# Patient Record
Sex: FEMALE | Race: WHITE | NOT HISPANIC OR LATINO | Employment: FULL TIME | URBAN - METROPOLITAN AREA
[De-identification: names, ages, dates, MRNs, and addresses within clinical notes are randomized per-mention and may not be internally consistent; named-entity substitution may affect disease eponyms.]

---

## 2018-02-20 ENCOUNTER — AMBULATORY - HEALTHEAST (OUTPATIENT)
Dept: INTERNAL MEDICINE | Facility: CLINIC | Age: 48
End: 2018-02-20

## 2018-02-21 ENCOUNTER — OFFICE VISIT - HEALTHEAST (OUTPATIENT)
Dept: INTERNAL MEDICINE | Facility: CLINIC | Age: 48
End: 2018-02-21

## 2018-02-21 DIAGNOSIS — F32.A CHRONIC DEPRESSION: ICD-10-CM

## 2018-02-21 DIAGNOSIS — Z00.00 ANNUAL PHYSICAL EXAM: ICD-10-CM

## 2018-02-21 DIAGNOSIS — Z12.11 COLON CANCER SCREENING: ICD-10-CM

## 2018-02-21 DIAGNOSIS — F17.200 TOBACCO DEPENDENCE: ICD-10-CM

## 2018-02-21 DIAGNOSIS — N39.41 URGE INCONTINENCE: ICD-10-CM

## 2018-02-21 DIAGNOSIS — Z12.31 VISIT FOR SCREENING MAMMOGRAM: ICD-10-CM

## 2018-02-21 DIAGNOSIS — I10 HYPERTENSION: ICD-10-CM

## 2018-02-21 DIAGNOSIS — R32 BLADDER INCONTINENCE: ICD-10-CM

## 2018-02-21 DIAGNOSIS — Z78.0 POSTMENOPAUSAL: ICD-10-CM

## 2018-02-21 LAB
ALBUMIN UR-MCNC: NEGATIVE MG/DL
APPEARANCE UR: CLEAR
BASOPHILS # BLD AUTO: 0 THOU/UL (ref 0–0.2)
BASOPHILS NFR BLD AUTO: 1 % (ref 0–2)
BILIRUB UR QL STRIP: NEGATIVE
COLOR UR AUTO: YELLOW
EOSINOPHIL # BLD AUTO: 0.2 THOU/UL (ref 0–0.4)
EOSINOPHIL NFR BLD AUTO: 3 % (ref 0–6)
ERYTHROCYTE [DISTWIDTH] IN BLOOD BY AUTOMATED COUNT: 10.9 % (ref 11–14.5)
GLUCOSE UR STRIP-MCNC: NEGATIVE MG/DL
HCT VFR BLD AUTO: 40.2 % (ref 35–47)
HGB BLD-MCNC: 13.4 G/DL (ref 12–16)
HGB UR QL STRIP: NEGATIVE
KETONES UR STRIP-MCNC: NEGATIVE MG/DL
LEUKOCYTE ESTERASE UR QL STRIP: NEGATIVE
LYMPHOCYTES # BLD AUTO: 1.4 THOU/UL (ref 0.8–4.4)
LYMPHOCYTES NFR BLD AUTO: 24 % (ref 20–40)
MCH RBC QN AUTO: 32.6 PG (ref 27–34)
MCHC RBC AUTO-ENTMCNC: 33.3 G/DL (ref 32–36)
MCV RBC AUTO: 98 FL (ref 80–100)
MONOCYTES # BLD AUTO: 0.4 THOU/UL (ref 0–0.9)
MONOCYTES NFR BLD AUTO: 7 % (ref 2–10)
NEUTROPHILS # BLD AUTO: 4 THOU/UL (ref 2–7.7)
NEUTROPHILS NFR BLD AUTO: 66 % (ref 50–70)
NITRATE UR QL: NEGATIVE
PH UR STRIP: 5.5 [PH] (ref 5–8)
PLATELET # BLD AUTO: 295 THOU/UL (ref 140–440)
PMV BLD AUTO: 6.3 FL (ref 7–10)
RBC # BLD AUTO: 4.11 MILL/UL (ref 3.8–5.4)
SP GR UR STRIP: <=1.005 (ref 1–1.03)
UROBILINOGEN UR STRIP-ACNC: NORMAL
WBC: 6 THOU/UL (ref 4–11)

## 2018-02-21 ASSESSMENT — MIFFLIN-ST. JEOR: SCORE: 1106.98

## 2018-02-22 ENCOUNTER — RECORDS - HEALTHEAST (OUTPATIENT)
Dept: ADMINISTRATIVE | Facility: OTHER | Age: 48
End: 2018-02-22

## 2018-02-22 LAB
ALBUMIN SERPL-MCNC: 4.3 G/DL (ref 3.5–5)
ALP SERPL-CCNC: 82 U/L (ref 45–120)
ALT SERPL W P-5'-P-CCNC: 34 U/L (ref 0–45)
ANION GAP SERPL CALCULATED.3IONS-SCNC: 10 MMOL/L (ref 5–18)
AST SERPL W P-5'-P-CCNC: 48 U/L (ref 0–40)
ATRIAL RATE - MUSE: 74 BPM
BILIRUB DIRECT SERPL-MCNC: 0.3 MG/DL
BILIRUB SERPL-MCNC: 0.8 MG/DL (ref 0–1)
BUN SERPL-MCNC: 4 MG/DL (ref 8–22)
CALCIUM SERPL-MCNC: 9.5 MG/DL (ref 8.5–10.5)
CHLORIDE BLD-SCNC: 94 MMOL/L (ref 98–107)
CHOLEST SERPL-MCNC: 253 MG/DL
CO2 SERPL-SCNC: 24 MMOL/L (ref 22–31)
CREAT SERPL-MCNC: 0.56 MG/DL (ref 0.6–1.1)
DIASTOLIC BLOOD PRESSURE - MUSE: NORMAL MMHG
ERYTHROCYTE [SEDIMENTATION RATE] IN BLOOD BY WESTERGREN METHOD: 5 MM/HR (ref 0–20)
GFR SERPL CREATININE-BSD FRML MDRD: >60 ML/MIN/1.73M2
GLUCOSE BLD-MCNC: 77 MG/DL (ref 70–125)
INTERPRETATION ECG - MUSE: NORMAL
P AXIS - MUSE: 77 DEGREES
POTASSIUM BLD-SCNC: 4.3 MMOL/L (ref 3.5–5)
PR INTERVAL - MUSE: 150 MS
PROT SERPL-MCNC: 7.5 G/DL (ref 6–8)
QRS DURATION - MUSE: 88 MS
QT - MUSE: 370 MS
QTC - MUSE: 410 MS
R AXIS - MUSE: 66 DEGREES
SODIUM SERPL-SCNC: 128 MMOL/L (ref 136–145)
SYSTOLIC BLOOD PRESSURE - MUSE: NORMAL MMHG
T AXIS - MUSE: 60 DEGREES
TSH SERPL DL<=0.005 MIU/L-ACNC: 2.17 UIU/ML (ref 0.3–5)
VENTRICULAR RATE- MUSE: 74 BPM

## 2018-02-23 ENCOUNTER — COMMUNICATION - HEALTHEAST (OUTPATIENT)
Dept: INTERNAL MEDICINE | Facility: CLINIC | Age: 48
End: 2018-02-23

## 2018-02-25 ENCOUNTER — AMBULATORY - HEALTHEAST (OUTPATIENT)
Dept: INTERNAL MEDICINE | Facility: CLINIC | Age: 48
End: 2018-02-25

## 2018-02-25 DIAGNOSIS — R79.89 LOW VITAMIN D LEVEL: ICD-10-CM

## 2018-02-25 DIAGNOSIS — G60.8 PERIPHERAL SENSORY NEUROPATHY: ICD-10-CM

## 2018-02-25 LAB
25(OH)D3 SERPL-MCNC: 5.4 NG/ML (ref 30–80)
25(OH)D3 SERPL-MCNC: 5.4 NG/ML (ref 30–80)

## 2018-02-26 ENCOUNTER — COMMUNICATION - HEALTHEAST (OUTPATIENT)
Dept: INTERNAL MEDICINE | Facility: CLINIC | Age: 48
End: 2018-02-26

## 2018-04-04 ENCOUNTER — COMMUNICATION - HEALTHEAST (OUTPATIENT)
Dept: INTERNAL MEDICINE | Facility: CLINIC | Age: 48
End: 2018-04-04

## 2018-04-05 ENCOUNTER — OFFICE VISIT - HEALTHEAST (OUTPATIENT)
Dept: INTERNAL MEDICINE | Facility: CLINIC | Age: 48
End: 2018-04-05

## 2018-04-05 DIAGNOSIS — G60.8 PERIPHERAL SENSORY NEUROPATHY: ICD-10-CM

## 2018-04-05 DIAGNOSIS — R79.89 LOW VITAMIN D LEVEL: ICD-10-CM

## 2018-04-05 DIAGNOSIS — Z78.0 POSTMENOPAUSAL: ICD-10-CM

## 2018-04-05 DIAGNOSIS — I42.9 CARDIOMYOPATHY (H): ICD-10-CM

## 2018-04-05 DIAGNOSIS — T07.XXXA FRACTURES: ICD-10-CM

## 2018-04-05 DIAGNOSIS — R79.89 ELEVATED BRAIN NATRIURETIC PEPTIDE (BNP) LEVEL: ICD-10-CM

## 2018-04-05 DIAGNOSIS — E87.1 HYPONATREMIA: ICD-10-CM

## 2018-04-05 DIAGNOSIS — F32.A CHRONIC DEPRESSION: ICD-10-CM

## 2018-04-05 DIAGNOSIS — I10 HYPERTENSION: ICD-10-CM

## 2018-04-05 LAB
ALBUMIN SERPL-MCNC: 4 G/DL (ref 3.5–5)
ANION GAP SERPL CALCULATED.3IONS-SCNC: 14 MMOL/L (ref 5–18)
BNP SERPL-MCNC: 23 PG/ML (ref 0–67)
BUN SERPL-MCNC: 4 MG/DL (ref 8–22)
CALCIUM SERPL-MCNC: 9.7 MG/DL (ref 8.5–10.5)
CHLORIDE BLD-SCNC: 93 MMOL/L (ref 98–107)
CO2 SERPL-SCNC: 20 MMOL/L (ref 22–31)
CREAT SERPL-MCNC: 0.55 MG/DL (ref 0.6–1.1)
GFR SERPL CREATININE-BSD FRML MDRD: >60 ML/MIN/1.73M2
GLUCOSE BLD-MCNC: 78 MG/DL (ref 70–125)
PHOSPHATE SERPL-MCNC: 4.2 MG/DL (ref 2.5–4.5)
POTASSIUM BLD-SCNC: 4.5 MMOL/L (ref 3.5–5)
PTH-INTACT SERPL-MCNC: 49 PG/ML (ref 10–86)
SODIUM SERPL-SCNC: 127 MMOL/L (ref 136–145)
VIT B12 SERPL-MCNC: >2000 PG/ML (ref 213–816)

## 2018-04-05 ASSESSMENT — MIFFLIN-ST. JEOR: SCORE: 1106.8

## 2018-04-09 LAB
GLIADIN IGA SER-ACNC: 0.8 U/ML
GLIADIN IGG SER-ACNC: <0.4 U/ML
IGA SERPL-MCNC: 132 MG/DL (ref 65–400)
TTG IGA SER-ACNC: 0.2 U/ML
TTG IGG SER-ACNC: <0.6 U/ML

## 2018-04-17 ENCOUNTER — RECORDS - HEALTHEAST (OUTPATIENT)
Dept: BONE DENSITY | Facility: CLINIC | Age: 48
End: 2018-04-17

## 2018-04-17 ENCOUNTER — RECORDS - HEALTHEAST (OUTPATIENT)
Dept: ADMINISTRATIVE | Facility: OTHER | Age: 48
End: 2018-04-17

## 2018-04-17 DIAGNOSIS — T14.8XXA OTHER INJURY OF UNSPECIFIED BODY REGION, INITIAL ENCOUNTER: ICD-10-CM

## 2018-04-17 DIAGNOSIS — Z78.0 ASYMPTOMATIC MENOPAUSAL STATE: ICD-10-CM

## 2018-04-23 ENCOUNTER — AMBULATORY - HEALTHEAST (OUTPATIENT)
Dept: INTERNAL MEDICINE | Facility: CLINIC | Age: 48
End: 2018-04-23

## 2018-06-06 ENCOUNTER — OFFICE VISIT - HEALTHEAST (OUTPATIENT)
Dept: INTERNAL MEDICINE | Facility: CLINIC | Age: 48
End: 2018-06-06

## 2018-06-06 DIAGNOSIS — H91.90 HEARING LOSS: ICD-10-CM

## 2018-06-06 DIAGNOSIS — M85.80 OSTEOPENIA: ICD-10-CM

## 2018-06-06 DIAGNOSIS — R79.89 LOW VITAMIN D LEVEL: ICD-10-CM

## 2018-06-06 DIAGNOSIS — E87.1 HYPONATREMIA: ICD-10-CM

## 2018-06-06 DIAGNOSIS — I10 HYPERTENSION: ICD-10-CM

## 2018-06-06 LAB
ALBUMIN SERPL-MCNC: 4.2 G/DL (ref 3.5–5)
ANION GAP SERPL CALCULATED.3IONS-SCNC: 12 MMOL/L (ref 5–18)
BUN SERPL-MCNC: 6 MG/DL (ref 8–22)
CALCIUM SERPL-MCNC: 9.8 MG/DL (ref 8.5–10.5)
CHLORIDE BLD-SCNC: 95 MMOL/L (ref 98–107)
CO2 SERPL-SCNC: 23 MMOL/L (ref 22–31)
CREAT SERPL-MCNC: 0.54 MG/DL (ref 0.6–1.1)
GFR SERPL CREATININE-BSD FRML MDRD: >60 ML/MIN/1.73M2
GLUCOSE BLD-MCNC: 75 MG/DL (ref 70–125)
PHOSPHATE SERPL-MCNC: 4.8 MG/DL (ref 2.5–4.5)
POTASSIUM BLD-SCNC: 4.4 MMOL/L (ref 3.5–5)
SODIUM SERPL-SCNC: 130 MMOL/L (ref 136–145)

## 2018-06-06 ASSESSMENT — MIFFLIN-ST. JEOR: SCORE: 1120.4

## 2018-06-07 LAB
25(OH)D3 SERPL-MCNC: 12.6 NG/ML (ref 30–80)
25(OH)D3 SERPL-MCNC: 12.6 NG/ML (ref 30–80)

## 2018-06-19 ENCOUNTER — COMMUNICATION - HEALTHEAST (OUTPATIENT)
Dept: INTERNAL MEDICINE | Facility: CLINIC | Age: 48
End: 2018-06-19

## 2018-10-24 ENCOUNTER — COMMUNICATION - HEALTHEAST (OUTPATIENT)
Dept: INTERNAL MEDICINE | Facility: CLINIC | Age: 48
End: 2018-10-24

## 2018-10-29 ENCOUNTER — OFFICE VISIT - HEALTHEAST (OUTPATIENT)
Dept: INTERNAL MEDICINE | Facility: CLINIC | Age: 48
End: 2018-10-29

## 2018-10-29 ENCOUNTER — COMMUNICATION - HEALTHEAST (OUTPATIENT)
Dept: INTERNAL MEDICINE | Facility: CLINIC | Age: 48
End: 2018-10-29

## 2018-10-29 DIAGNOSIS — I10 HYPERTENSION: ICD-10-CM

## 2018-10-29 DIAGNOSIS — G43.909 MIGRAINE HEADACHE: ICD-10-CM

## 2018-10-29 DIAGNOSIS — R10.84 ABDOMINAL PAIN, GENERALIZED: ICD-10-CM

## 2018-10-29 DIAGNOSIS — R10.30 LOWER ABDOMINAL PAIN: ICD-10-CM

## 2018-10-29 LAB
ALBUMIN UR-MCNC: NEGATIVE MG/DL
APPEARANCE UR: CLEAR
BACTERIA #/AREA URNS HPF: ABNORMAL HPF
BILIRUB UR QL STRIP: NEGATIVE
COLOR UR AUTO: YELLOW
GLUCOSE UR STRIP-MCNC: NEGATIVE MG/DL
HGB UR QL STRIP: ABNORMAL
KETONES UR STRIP-MCNC: NEGATIVE MG/DL
LEUKOCYTE ESTERASE UR QL STRIP: ABNORMAL
NITRATE UR QL: NEGATIVE
PH UR STRIP: 6 [PH] (ref 5–8)
RBC #/AREA URNS AUTO: ABNORMAL HPF
SP GR UR STRIP: <=1.005 (ref 1–1.03)
SQUAMOUS #/AREA URNS AUTO: ABNORMAL LPF
UROBILINOGEN UR STRIP-ACNC: ABNORMAL
WBC #/AREA URNS AUTO: ABNORMAL HPF

## 2018-10-29 ASSESSMENT — MIFFLIN-ST. JEOR: SCORE: 1124.94

## 2018-10-30 ENCOUNTER — HOSPITAL ENCOUNTER (OUTPATIENT)
Dept: CT IMAGING | Facility: CLINIC | Age: 48
Discharge: HOME | End: 2018-10-30
Attending: INTERNAL MEDICINE

## 2018-10-30 ENCOUNTER — COMMUNICATION - HEALTHEAST (OUTPATIENT)
Dept: INTERNAL MEDICINE | Facility: CLINIC | Age: 48
End: 2018-10-30

## 2018-10-30 DIAGNOSIS — R10.30 LOWER ABDOMINAL PAIN: ICD-10-CM

## 2018-10-30 LAB
ALBUMIN SERPL-MCNC: 3.7 G/DL (ref 3.5–5)
ALBUMIN SERPL-MCNC: 3.7 G/DL (ref 3.5–5)
ALP SERPL-CCNC: 79 U/L (ref 45–120)
ALT SERPL W P-5'-P-CCNC: 26 U/L (ref 0–45)
ANION GAP SERPL CALCULATED.3IONS-SCNC: 15 MMOL/L (ref 5–18)
AST SERPL W P-5'-P-CCNC: 34 U/L (ref 0–40)
BILIRUB DIRECT SERPL-MCNC: 0.2 MG/DL
BILIRUB SERPL-MCNC: 0.4 MG/DL (ref 0–1)
BUN SERPL-MCNC: <2 MG/DL (ref 8–22)
CALCIUM SERPL-MCNC: 9 MG/DL (ref 8.5–10.5)
CHLORIDE BLD-SCNC: 90 MMOL/L (ref 98–107)
CO2 SERPL-SCNC: 20 MMOL/L (ref 22–31)
CREAT SERPL-MCNC: 0.53 MG/DL (ref 0.6–1.1)
GFR SERPL CREATININE-BSD FRML MDRD: >60 ML/MIN/1.73M2
GLUCOSE BLD-MCNC: 100 MG/DL (ref 70–125)
LIPASE SERPL-CCNC: 43 U/L (ref 0–52)
PHOSPHATE SERPL-MCNC: 2.9 MG/DL (ref 2.5–4.5)
POTASSIUM BLD-SCNC: 3.4 MMOL/L (ref 3.5–5)
PROT SERPL-MCNC: 6.7 G/DL (ref 6–8)
SODIUM SERPL-SCNC: 125 MMOL/L (ref 136–145)

## 2018-11-01 ENCOUNTER — AMBULATORY - HEALTHEAST (OUTPATIENT)
Dept: INTERNAL MEDICINE | Facility: CLINIC | Age: 48
End: 2018-11-01

## 2018-11-01 ENCOUNTER — COMMUNICATION - HEALTHEAST (OUTPATIENT)
Dept: INTERNAL MEDICINE | Facility: CLINIC | Age: 48
End: 2018-11-01

## 2018-11-12 ENCOUNTER — COMMUNICATION - HEALTHEAST (OUTPATIENT)
Dept: INTERNAL MEDICINE | Facility: CLINIC | Age: 48
End: 2018-11-12

## 2018-11-19 ENCOUNTER — COMMUNICATION - HEALTHEAST (OUTPATIENT)
Dept: INTERNAL MEDICINE | Facility: CLINIC | Age: 48
End: 2018-11-19

## 2019-02-24 ENCOUNTER — COMMUNICATION - HEALTHEAST (OUTPATIENT)
Dept: INTERNAL MEDICINE | Facility: CLINIC | Age: 49
End: 2019-02-24

## 2019-02-24 DIAGNOSIS — I10 HYPERTENSION: ICD-10-CM

## 2019-05-04 ENCOUNTER — COMMUNICATION - HEALTHEAST (OUTPATIENT)
Dept: INTERNAL MEDICINE | Facility: CLINIC | Age: 49
End: 2019-05-04

## 2019-05-04 DIAGNOSIS — I10 HYPERTENSION: ICD-10-CM

## 2020-06-05 ENCOUNTER — HOME CARE/HOSPICE - HEALTHEAST (OUTPATIENT)
Dept: HOME HEALTH SERVICES | Facility: HOME HEALTH | Age: 50
End: 2020-06-05

## 2020-06-05 ENCOUNTER — COMMUNICATION - HEALTHEAST (OUTPATIENT)
Dept: NEUROSURGERY | Facility: CLINIC | Age: 50
End: 2020-06-05

## 2020-06-05 DIAGNOSIS — S06.2XAA: ICD-10-CM

## 2020-06-07 ENCOUNTER — COMMUNICATION - HEALTHEAST (OUTPATIENT)
Dept: INTERNAL MEDICINE | Facility: CLINIC | Age: 50
End: 2020-06-07

## 2020-06-07 DIAGNOSIS — I10 HYPERTENSION: ICD-10-CM

## 2020-06-08 ENCOUNTER — COMMUNICATION - HEALTHEAST (OUTPATIENT)
Dept: HOME HEALTH SERVICES | Facility: HOME HEALTH | Age: 50
End: 2020-06-08

## 2020-06-11 ENCOUNTER — COMMUNICATION - HEALTHEAST (OUTPATIENT)
Dept: SCHEDULING | Facility: CLINIC | Age: 50
End: 2020-06-11

## 2020-06-15 ENCOUNTER — COMMUNICATION - HEALTHEAST (OUTPATIENT)
Dept: INTERNAL MEDICINE | Facility: CLINIC | Age: 50
End: 2020-06-15

## 2020-06-19 ENCOUNTER — HOSPITAL ENCOUNTER (OUTPATIENT)
Dept: CT IMAGING | Facility: CLINIC | Age: 50
Discharge: HOME | End: 2020-06-19
Attending: NEUROLOGICAL SURGERY

## 2020-06-19 DIAGNOSIS — S06.2XAA: ICD-10-CM

## 2020-06-23 ENCOUNTER — OFFICE VISIT - HEALTHEAST (OUTPATIENT)
Dept: NEUROSURGERY | Facility: CLINIC | Age: 50
End: 2020-06-23

## 2020-06-23 ENCOUNTER — COMMUNICATION - HEALTHEAST (OUTPATIENT)
Dept: NEUROSURGERY | Facility: CLINIC | Age: 50
End: 2020-06-23

## 2020-06-23 DIAGNOSIS — S06.2X1D CONTUSION OF BRAIN WITH LOSS OF CONSCIOUSNESS OF 30 MINUTES OR LESS, SUBSEQUENT ENCOUNTER: ICD-10-CM

## 2020-07-23 ENCOUNTER — COMMUNICATION - HEALTHEAST (OUTPATIENT)
Dept: PHARMACY | Facility: CLINIC | Age: 50
End: 2020-07-23

## 2020-07-23 DIAGNOSIS — R56.9 SEIZURE (H): ICD-10-CM

## 2020-07-23 DIAGNOSIS — I10 HYPERTENSION: ICD-10-CM

## 2020-07-24 ENCOUNTER — HOSPITAL ENCOUNTER (OUTPATIENT)
Dept: CT IMAGING | Facility: CLINIC | Age: 50
Discharge: HOME | End: 2020-07-24

## 2020-07-24 DIAGNOSIS — S06.2X1D CONTUSION OF BRAIN WITH LOSS OF CONSCIOUSNESS OF 30 MINUTES OR LESS, SUBSEQUENT ENCOUNTER: ICD-10-CM

## 2020-07-28 ENCOUNTER — OFFICE VISIT - HEALTHEAST (OUTPATIENT)
Dept: NEUROSURGERY | Facility: CLINIC | Age: 50
End: 2020-07-28

## 2020-07-28 DIAGNOSIS — E87.1 HYPONATREMIA: ICD-10-CM

## 2020-07-28 DIAGNOSIS — I62.9 ACUTE INTRA-CRANIAL HEMORRHAGE (H): ICD-10-CM

## 2020-07-28 DIAGNOSIS — R56.9 SEIZURE (H): ICD-10-CM

## 2020-07-29 ENCOUNTER — COMMUNICATION - HEALTHEAST (OUTPATIENT)
Dept: ADMINISTRATIVE | Facility: CLINIC | Age: 50
End: 2020-07-29

## 2020-08-03 ENCOUNTER — COMMUNICATION - HEALTHEAST (OUTPATIENT)
Dept: INTERNAL MEDICINE | Facility: CLINIC | Age: 50
End: 2020-08-03

## 2020-08-03 ENCOUNTER — OFFICE VISIT - HEALTHEAST (OUTPATIENT)
Dept: INTERNAL MEDICINE | Facility: CLINIC | Age: 50
End: 2020-08-03

## 2020-08-03 DIAGNOSIS — Z12.31 ENCOUNTER FOR SCREENING MAMMOGRAM FOR BREAST CANCER: ICD-10-CM

## 2020-08-03 DIAGNOSIS — I10 ESSENTIAL HYPERTENSION: ICD-10-CM

## 2020-08-03 DIAGNOSIS — F32.1 CURRENT MODERATE EPISODE OF MAJOR DEPRESSIVE DISORDER, UNSPECIFIED WHETHER RECURRENT (H): ICD-10-CM

## 2020-08-03 DIAGNOSIS — Z12.11 ENCOUNTER FOR SCREENING COLONOSCOPY: ICD-10-CM

## 2020-08-03 DIAGNOSIS — G40.909 SEIZURE DISORDER (H): ICD-10-CM

## 2020-08-03 DIAGNOSIS — E87.1 HYPONATREMIA: ICD-10-CM

## 2020-08-03 ASSESSMENT — ANXIETY QUESTIONNAIRES
7. FEELING AFRAID AS IF SOMETHING AWFUL MIGHT HAPPEN: MORE THAN HALF THE DAYS
2. NOT BEING ABLE TO STOP OR CONTROL WORRYING: MORE THAN HALF THE DAYS
1. FEELING NERVOUS, ANXIOUS, OR ON EDGE: MORE THAN HALF THE DAYS
IF YOU CHECKED OFF ANY PROBLEMS ON THIS QUESTIONNAIRE, HOW DIFFICULT HAVE THESE PROBLEMS MADE IT FOR YOU TO DO YOUR WORK, TAKE CARE OF THINGS AT HOME, OR GET ALONG WITH OTHER PEOPLE: SOMEWHAT DIFFICULT
GAD7 TOTAL SCORE: 11
5. BEING SO RESTLESS THAT IT IS HARD TO SIT STILL: SEVERAL DAYS
6. BECOMING EASILY ANNOYED OR IRRITABLE: MORE THAN HALF THE DAYS
3. WORRYING TOO MUCH ABOUT DIFFERENT THINGS: SEVERAL DAYS
4. TROUBLE RELAXING: SEVERAL DAYS

## 2020-08-03 ASSESSMENT — PATIENT HEALTH QUESTIONNAIRE - PHQ9: SUM OF ALL RESPONSES TO PHQ QUESTIONS 1-9: 9

## 2020-08-04 LAB
ANION GAP SERPL CALCULATED.3IONS-SCNC: 12 MMOL/L (ref 5–18)
BUN SERPL-MCNC: 4 MG/DL (ref 8–22)
CALCIUM SERPL-MCNC: 10 MG/DL (ref 8.5–10.5)
CHLORIDE BLD-SCNC: 95 MMOL/L (ref 98–107)
CO2 SERPL-SCNC: 25 MMOL/L (ref 22–31)
CREAT SERPL-MCNC: 0.58 MG/DL (ref 0.6–1.1)
GFR SERPL CREATININE-BSD FRML MDRD: >60 ML/MIN/1.73M2
GLUCOSE BLD-MCNC: 75 MG/DL (ref 70–125)
LEVETIRACETAM (KEPPRA): 15.2 UG/ML (ref 6–46)
POTASSIUM BLD-SCNC: 4.6 MMOL/L (ref 3.5–5)
SODIUM SERPL-SCNC: 132 MMOL/L (ref 136–145)

## 2020-08-05 ENCOUNTER — COMMUNICATION - HEALTHEAST (OUTPATIENT)
Dept: INTERNAL MEDICINE | Facility: CLINIC | Age: 50
End: 2020-08-05

## 2020-08-06 ENCOUNTER — COMMUNICATION - HEALTHEAST (OUTPATIENT)
Dept: INTERNAL MEDICINE | Facility: CLINIC | Age: 50
End: 2020-08-06

## 2020-08-11 ENCOUNTER — COMMUNICATION - HEALTHEAST (OUTPATIENT)
Dept: INTERNAL MEDICINE | Facility: CLINIC | Age: 50
End: 2020-08-11

## 2020-08-12 PROBLEM — S06.341A: Status: ACTIVE | Noted: 2020-06-02

## 2020-08-12 PROBLEM — F17.200 TOBACCO USE DISORDER: Status: ACTIVE | Noted: 2020-08-12

## 2020-08-12 PROBLEM — G93.40 ENCEPHALOPATHY: Status: ACTIVE | Noted: 2020-06-04

## 2020-08-21 ENCOUNTER — COMMUNICATION - HEALTHEAST (OUTPATIENT)
Dept: PHARMACY | Facility: CLINIC | Age: 50
End: 2020-08-21

## 2020-08-21 DIAGNOSIS — R56.9 SEIZURE (H): ICD-10-CM

## 2020-08-25 ENCOUNTER — RECORDS - HEALTHEAST (OUTPATIENT)
Dept: ADMINISTRATIVE | Facility: OTHER | Age: 50
End: 2020-08-25

## 2020-08-25 ENCOUNTER — RECORDS - HEALTHEAST (OUTPATIENT)
Dept: NEUROLOGY | Facility: CLINIC | Age: 50
End: 2020-08-25

## 2020-08-25 ENCOUNTER — VIRTUAL VISIT (OUTPATIENT)
Dept: NEUROLOGY | Facility: CLINIC | Age: 50
End: 2020-08-25
Payer: COMMERCIAL

## 2020-08-25 VITALS — BODY MASS INDEX: 19.63 KG/M2 | HEIGHT: 64 IN | WEIGHT: 115 LBS

## 2020-08-25 DIAGNOSIS — F10.930 ALCOHOL WITHDRAWAL SEIZURE WITHOUT COMPLICATION (H): ICD-10-CM

## 2020-08-25 DIAGNOSIS — S06.331D: ICD-10-CM

## 2020-08-25 DIAGNOSIS — R56.9 ALCOHOL WITHDRAWAL SEIZURE WITHOUT COMPLICATION (H): ICD-10-CM

## 2020-08-25 PROBLEM — I62.9 ACUTE INTRA-CRANIAL HEMORRHAGE (H): Status: ACTIVE | Noted: 2020-06-02

## 2020-08-25 PROBLEM — I62.9 INTRACRANIAL HEMORRHAGE (H): Status: ACTIVE | Noted: 2020-06-02

## 2020-08-25 PROBLEM — S06.33AA CEREBRAL CONTUSION (H): Status: ACTIVE | Noted: 2020-08-25

## 2020-08-25 PROBLEM — F10.10 ETOH ABUSE: Status: ACTIVE | Noted: 2020-08-25

## 2020-08-25 PROBLEM — F32.A CHRONIC DEPRESSION: Status: ACTIVE | Noted: 2018-02-21

## 2020-08-25 PROBLEM — F10.939 ALCOHOL WITHDRAWAL SEIZURE (H): Status: ACTIVE | Noted: 2020-08-25

## 2020-08-25 PROBLEM — F12.10 CANNABIS ABUSE: Status: ACTIVE | Noted: 2020-08-25

## 2020-08-25 PROCEDURE — 99214 OFFICE O/P EST MOD 30 MIN: CPT | Mod: TEL | Performed by: PSYCHIATRY & NEUROLOGY

## 2020-08-25 ASSESSMENT — MIFFLIN-ST. JEOR: SCORE: 1126.64

## 2020-08-25 NOTE — PROGRESS NOTES
NEUROLOGY FOLLOW UP VISIT  NOTE       [facility] NEUROLOGY [place]  [address] [place] [address]  Tel: [phone]  Fax: [phone]  [email or web address]     Ivon Sheets,  1970, MRN [id number]  PCP: Allyn Watt, [phone]  Date: 2020     ASSESSMENT & PLAN     Diagnosis code:    Alcohol withdrawal seizure without complication (H)  Contusion of cerebrum with loss of consciousness of 30 minutes or less, unspecified laterality, subsequent encounter     Bilateral intracranial hemorrhage; H/O alcohol withdrawal seizure  50-year-old female with history of polysubstance abuse, alcohol abuse, alcohol withdrawal seizure, chronic hyponatremia, depression who was admitted to the hospital on 6/3/2020 after she had a seizure at a gas station.  She was noted to have bilateral intracranial hemorrhage that was managed conservatively.  Most recent CT scan shows complete resolution of the hemorrhage.  She was started on Keppra and she is wondering if she can discontinue Keppra.  I have recommended checking MRI of the head to rule out any contusion and a EEG and if normal I will taper her off Keppra.  She was again encouraged to avoid drinking.  Follow-up will be face-to-face after the EEG.    Thank you again for this referral, please feel free to contact me if you have any questions.    Nawaf Bolivar MD  [facility]  (Formerly, [facility]     HISTORY OF PRESENT ILLNESS     Patient is a 50-year-old female with history of polysubstance abuse, alcohol abuse, seizures, chronic hyponatremia, depression was brought to the emergency room on 6/3/2020 after she had a fall at a gas station hitting her head and had 2 seizures.  She was given Ativan in the emergency room and was noted to be agitated and was intubated.  She had a CT of the head that showed bilateral parietal and right temporal hemorrhages with minimal subarachnoid  hemorrhages.  She was started on Keppra and afterwards had an EEG that showed nonspecific slowing.  Due to contusion noted on CT scan she was continued on Keppra.  Most recent CT scan done on 7/24/2020 shows resolution of previously described multi-compartmental hemorrhage and with complete resolution of the right parietal scalp hematoma.  She was kept on Keppra and most recent level was therapeutic.  Since her discharge she reports she has been doing well and denies any seizures.  She reports multiple vague symptoms and is wondering if during the head injury she caused permanent damage to the brain.  She is also wondering why she runs chronic hyponatremia.  Her primary care physician had referred her to endocrinologist but she has difficulty getting an appointment.  Although she carries a history of seizures those likely were alcohol withdrawal seizures and that she is wondering if she can stop taking Keppra.     PROBLEM LIST   Patient Active Problem List   Diagnosis Code     Acute intra-cranial hemorrhage (H) I62.9     Intracranial hemorrhage (H) I62.9     Tobacco use disorder F17.200     Hyponatremia E87.1     ETOH abuse F10.10     Cannabis abuse F12.10     Takotsubo cardiomyopathy I51.81     Chronic depression F32.9     Marijuana use F12.90     Alcohol withdrawal seizure (H) F10.239, R56.9     Cerebral contusion (H) S06.339A         PAST MEDICAL & SURGICAL HISTORY     Past Medical History:   Patient  has no past medical history on file.    Surgical History:  She  has no past surgical history on file.     SOCIAL HISTORY     Reviewed, and she  reports that she has been smoking cigarettes. She has smoked for the past 26.00 years. She has never used smokeless tobacco. She reports current alcohol use of about 4.0 standard drinks of alcohol per week.     FAMILY HISTORY     Reviewed, and family history includes Alcoholism in her brother; Coronary Artery Disease in her father.     ALLERGIES     No Known Allergies   "    REVIEW OF SYSTEMS     A 12 point review of system was performed and was negative except as outlined in the history of present illness.     HOME MEDICATIONS       Current Outpatient Medications:      amLODIPine (NORVASC) 10 MG tablet, Take 5 mg by mouth, Disp: , Rfl:      levETIRAcetam (KEPPRA) 500 MG tablet, Take 500 mg by mouth 2 times daily, Disp: , Rfl:      magnesium oxide (MAG-OX) 400 MG tablet, Take 400 mg by mouth, Disp: , Rfl:      metoprolol succinate ER (TOPROL-XL) 25 MG 24 hr tablet, Take 25 mg by mouth, Disp: , Rfl:      HYDROcodone-acetaminophen (NORCO) 5-325 MG tablet, Take 1-2 tablets by mouth, Disp: , Rfl:      omeprazole (PRILOSEC) 20 MG DR capsule, Take 20 mg by mouth, Disp: , Rfl:      THIAMINE HCL PO, Take 100 mg by mouth, Disp: , Rfl:       PHYSICAL EXAM     Vital signs  Ht 1.626 m (5' 4\")   Wt 52.2 kg (115 lb)   BMI 19.74 kg/m      Weight:   115 lbs 0 oz    GENERAL PHYSICAL EXAM: Patient is alert and oriented x 4 in no acute distress. Neck was supple   NEUROLOGICAL EXAM:  Patient is alert and oriented x3 speech normal with no dysarthria or aphasia.  Cranial nerves intact.  She reports no weakness     DIAGNOSTIC STUDIES     PERTINENT RADIOLOGY  Following imaging studies were reviewed:     CT  7/24/20  1.  Interval resolution of previously described demonstrated multi compartmental hemorrhages.  2.  No evidence of new acute intracranial hemorrhage or mass effect.  3.  Near complete resolution of right parietal scalp hematoma.  6/4/20  1.  Tiny 2 mm hemorrhagic parenchymal contusion in the left parietal lobe is new from the prior exam. The remaining hemorrhagic parenchymal contusions are stable in size.   2.  Slightly decreased volume of subarachnoid hemorrhage.   6/3/20  1.  Multiple small acute intracranial hemorrhages as described above, which are mildly worse compared to CT head 06/02/2020. Please see above for discussion.   2.  No midline shift.      6/2/20  HEAD CT:   1.  Large right " "parietal scalp hematoma.   2.  Small bilateral parietal and small right temporal parenchymal hemorrhages consistent with post moderate.   3.  Minimal subarachnoid hemorrhage in the right sylvian fissure also most consistent with posttraumatic change.     CERVICAL SPINE CT:   1.  Mild paraseptal emphysematous change.   2.  No spinal canal or neural foraminal stenosis.      EEG  EEG shows low amplitude relatively normal background with occasional slowing more so on the left side.  This likely is due to patient's known intracerebral hemorrhage      PERTINENT LABS  Following labs were reviewed:     Ref. Range 8/3/2020 16:40   Sodium Latest Ref Range: 136 - 145 mmol/L 132 (L)   Potassium Latest Ref Range: 3.5 - 5.0 mmol/L 4.6   Chloride Latest Ref Range: 98 - 107 mmol/L 95 (L)   CO2 Latest Ref Range: 22 - 31 mmol/L 25   Anion Gap, Calculation Latest Ref Range: 5 - 18 mmol/L 12   BUN Latest Ref Range: 8 - 22 mg/dL 4 (L)   Creatinine Latest Ref Range: 0.60 - 1.10 mg/dL 0.58 (L)   GFR MDRD Af Amer Latest Ref Range: >60 mL/min/1.73m2 >60   GFR MDRD Non Af Amer Latest Ref Range: >60 mL/min/1.73m2 >60   Calcium Latest Ref Range: 8.5 - 10.5 mg/dL 10.0   Glucose Latest Ref Range: 70 - 125 mg/dL 75   Levetiracetam Latest Ref Range: 6.0 - 46.0 ug/mL 15.2         TELEPHONE VISIT CONSENT   This telephone visit was done in lieu of a face to face visit due to COVID 19 pandemic. The patient has been notified of following:    \"This telephone visit will be conducted via a call between you and your physician/provider. We have found that certain health care needs can be provided without the need for a physical exam. This service lets us provide the care you need with a short phone conversation. If a prescription is necessary we can send it directly to your pharmacy. If lab work is needed we can place an order for that and you can then stop by our lab to have the test done at a later time. If during the course of the call the " "physician/provider feels a telephone visit is not appropriate, you will not be charged for this service.\"    Patient has given verbal consent for Telephone visit? YES  Consent has been obtained for this service by 1 care team member: YES    Total Time: visit 30 minutes        Total time spent for face to face visit, reviewing labs/imaging studies, counseling and coordination of care was: 30 Minutes More than 50% of this time was spent on counseling and coordination of care.      This note was dictated using voice recognition software.  Any grammatical or context distortions are unintentional and inherent to the software.         "

## 2020-08-25 NOTE — LETTER
2020         RE: Ivon Sheets  [address]  [place] [address]        Dear Colleague,    Thank you for referring your patient, Ivon Sheets, to the [facility]. Please see a copy of my visit note below.    NEUROLOGY FOLLOW UP VISIT  NOTE       [facility] NEUROLOGY [place]  [address] [place] [address]  Tel: [phone]  Fax: [phone]  [email or web address]     Ivon Sheets,  1970, MRN [id number]  PCP: Allyn Watt, [phone]  Date: 2020     ASSESSMENT & PLAN     Diagnosis code:    Alcohol withdrawal seizure without complication (H)  Contusion of cerebrum with loss of consciousness of 30 minutes or less, unspecified laterality, subsequent encounter     Bilateral intracranial hemorrhage; H/O alcohol withdrawal seizure  50-year-old female with history of polysubstance abuse, alcohol abuse, alcohol withdrawal seizure, chronic hyponatremia, depression who was admitted to the hospital on 6/3/2020 after she had a seizure at a gas station.  She was noted to have bilateral intracranial hemorrhage that was managed conservatively.  Most recent CT scan shows complete resolution of the hemorrhage.  She was started on Keppra and she is wondering if she can discontinue Keppra.  I have recommended checking MRI of the head to rule out any contusion and a EEG and if normal I will taper her off Keppra.  She was again encouraged to avoid drinking.  Follow-up will be face-to-face after the EEG.    Thank you again for this referral, please feel free to contact me if you have any questions.    Nawaf Bolivar MD  [facility]  (Former[facility], [facility]     HISTORY OF PRESENT ILLNESS     Patient is a 50-year-old female with history of polysubstance abuse, alcohol abuse, seizures, chronic hyponatremia, depression was brought to the emergency room on 6/3/2020 after she had a fall  at a gas station hitting her head and had 2 seizures.  She was given Ativan in the emergency room and was noted to be agitated and was intubated.  She had a CT of the head that showed bilateral parietal and right temporal hemorrhages with minimal subarachnoid hemorrhages.  She was started on Keppra and afterwards had an EEG that showed nonspecific slowing.  Due to contusion noted on CT scan she was continued on Keppra.  Most recent CT scan done on 7/24/2020 shows resolution of previously described multi-compartmental hemorrhage and with complete resolution of the right parietal scalp hematoma.  She was kept on Keppra and most recent level was therapeutic.  Since her discharge she reports she has been doing well and denies any seizures.  She reports multiple vague symptoms and is wondering if during the head injury she caused permanent damage to the brain.  She is also wondering why she runs chronic hyponatremia.  Her primary care physician had referred her to endocrinologist but she has difficulty getting an appointment.  Although she carries a history of seizures those likely were alcohol withdrawal seizures and that she is wondering if she can stop taking Keppra.     PROBLEM LIST   Patient Active Problem List   Diagnosis Code     Acute intra-cranial hemorrhage (H) I62.9     Intracranial hemorrhage (H) I62.9     Tobacco use disorder F17.200     Hyponatremia E87.1     ETOH abuse F10.10     Cannabis abuse F12.10     Takotsubo cardiomyopathy I51.81     Chronic depression F32.9     Marijuana use F12.90     Alcohol withdrawal seizure (H) F10.239, R56.9     Cerebral contusion (H) S06.339A         PAST MEDICAL & SURGICAL HISTORY     Past Medical History:   Patient  has no past medical history on file.    Surgical History:  She  has no past surgical history on file.     SOCIAL HISTORY     Reviewed, and she  reports that she has been smoking cigarettes. She has smoked for the past 26.00 years. She has never used smokeless  "tobacco. She reports current alcohol use of about 4.0 standard drinks of alcohol per week.     FAMILY HISTORY     Reviewed, and family history includes Alcoholism in her brother; Coronary Artery Disease in her father.     ALLERGIES     No Known Allergies      REVIEW OF SYSTEMS     A 12 point review of system was performed and was negative except as outlined in the history of present illness.     HOME MEDICATIONS       Current Outpatient Medications:      amLODIPine (NORVASC) 10 MG tablet, Take 5 mg by mouth, Disp: , Rfl:      levETIRAcetam (KEPPRA) 500 MG tablet, Take 500 mg by mouth 2 times daily, Disp: , Rfl:      magnesium oxide (MAG-OX) 400 MG tablet, Take 400 mg by mouth, Disp: , Rfl:      metoprolol succinate ER (TOPROL-XL) 25 MG 24 hr tablet, Take 25 mg by mouth, Disp: , Rfl:      HYDROcodone-acetaminophen (NORCO) 5-325 MG tablet, Take 1-2 tablets by mouth, Disp: , Rfl:      omeprazole (PRILOSEC) 20 MG DR capsule, Take 20 mg by mouth, Disp: , Rfl:      THIAMINE HCL PO, Take 100 mg by mouth, Disp: , Rfl:       PHYSICAL EXAM     Vital signs  Ht 1.626 m (5' 4\")   Wt 52.2 kg (115 lb)   BMI 19.74 kg/m      Weight:   115 lbs 0 oz    GENERAL PHYSICAL EXAM: Patient is alert and oriented x 4 in no acute distress. Neck was supple   NEUROLOGICAL EXAM:  Patient is alert and oriented x3 speech normal with no dysarthria or aphasia.  Cranial nerves intact.  She reports no weakness     DIAGNOSTIC STUDIES     PERTINENT RADIOLOGY  Following imaging studies were reviewed:     CT  7/24/20  1.  Interval resolution of previously described demonstrated multi compartmental hemorrhages.  2.  No evidence of new acute intracranial hemorrhage or mass effect.  3.  Near complete resolution of right parietal scalp hematoma.  6/4/20  1.  Tiny 2 mm hemorrhagic parenchymal contusion in the left parietal lobe is new from the prior exam. The remaining hemorrhagic parenchymal contusions are stable in size.   2.  Slightly decreased volume of " "subarachnoid hemorrhage.   6/3/20  1.  Multiple small acute intracranial hemorrhages as described above, which are mildly worse compared to CT head 06/02/2020. Please see above for discussion.   2.  No midline shift.      6/2/20  HEAD CT:   1.  Large right parietal scalp hematoma.   2.  Small bilateral parietal and small right temporal parenchymal hemorrhages consistent with post moderate.   3.  Minimal subarachnoid hemorrhage in the right sylvian fissure also most consistent with posttraumatic change.     CERVICAL SPINE CT:   1.  Mild paraseptal emphysematous change.   2.  No spinal canal or neural foraminal stenosis.      EEG  EEG shows low amplitude relatively normal background with occasional slowing more so on the left side.  This likely is due to patient's known intracerebral hemorrhage      PERTINENT LABS  Following labs were reviewed:     Ref. Range 8/3/2020 16:40   Sodium Latest Ref Range: 136 - 145 mmol/L 132 (L)   Potassium Latest Ref Range: 3.5 - 5.0 mmol/L 4.6   Chloride Latest Ref Range: 98 - 107 mmol/L 95 (L)   CO2 Latest Ref Range: 22 - 31 mmol/L 25   Anion Gap, Calculation Latest Ref Range: 5 - 18 mmol/L 12   BUN Latest Ref Range: 8 - 22 mg/dL 4 (L)   Creatinine Latest Ref Range: 0.60 - 1.10 mg/dL 0.58 (L)   GFR MDRD Af Amer Latest Ref Range: >60 mL/min/1.73m2 >60   GFR MDRD Non Af Amer Latest Ref Range: >60 mL/min/1.73m2 >60   Calcium Latest Ref Range: 8.5 - 10.5 mg/dL 10.0   Glucose Latest Ref Range: 70 - 125 mg/dL 75   Levetiracetam Latest Ref Range: 6.0 - 46.0 ug/mL 15.2         TELEPHONE VISIT CONSENT   This telephone visit was done in lieu of a face to face visit due to COVID 19 pandemic. The patient has been notified of following:    \"This telephone visit will be conducted via a call between you and your physician/provider. We have found that certain health care needs can be provided without the need for a physical exam. This service lets us provide the care you need with a short phone " "conversation. If a prescription is necessary we can send it directly to your pharmacy. If lab work is needed we can place an order for that and you can then stop by our lab to have the test done at a later time. If during the course of the call the physician/provider feels a telephone visit is not appropriate, you will not be charged for this service.\"    Patient has given verbal consent for Telephone visit? YES  Consent has been obtained for this service by 1 care team member: YES    Total Time: visit 30 minutes        Total time spent for face to face visit, reviewing labs/imaging studies, counseling and coordination of care was: 30 Minutes More than 50% of this time was spent on counseling and coordination of care.      This note was dictated using voice recognition software.  Any grammatical or context distortions are unintentional and inherent to the software.           Again, thank you for allowing me to participate in the care of your patient.        Sincerely,        Nawaf Bolivar MD    "

## 2020-08-26 ENCOUNTER — RECORDS - HEALTHEAST (OUTPATIENT)
Dept: ADMINISTRATIVE | Facility: OTHER | Age: 50
End: 2020-08-26

## 2020-08-26 ENCOUNTER — TRANSCRIBE ORDERS (OUTPATIENT)
Dept: OTHER | Age: 50
End: 2020-08-26

## 2020-08-26 DIAGNOSIS — E87.1 HYPONATREMIA: Primary | ICD-10-CM

## 2020-08-31 ENCOUNTER — OFFICE VISIT - HEALTHEAST (OUTPATIENT)
Dept: INTERNAL MEDICINE | Facility: CLINIC | Age: 50
End: 2020-08-31

## 2020-08-31 ENCOUNTER — COMMUNICATION - HEALTHEAST (OUTPATIENT)
Dept: INTERNAL MEDICINE | Facility: CLINIC | Age: 50
End: 2020-08-31

## 2020-08-31 DIAGNOSIS — D64.9 NORMOCYTIC ANEMIA: ICD-10-CM

## 2020-08-31 DIAGNOSIS — Z12.11 COLON CANCER SCREENING: ICD-10-CM

## 2020-08-31 DIAGNOSIS — M85.851 OSTEOPENIA OF NECKS OF BOTH FEMURS: ICD-10-CM

## 2020-08-31 DIAGNOSIS — I10 ESSENTIAL HYPERTENSION: ICD-10-CM

## 2020-08-31 DIAGNOSIS — M85.852 OSTEOPENIA OF NECKS OF BOTH FEMURS: ICD-10-CM

## 2020-08-31 DIAGNOSIS — R56.9 SEIZURE (H): ICD-10-CM

## 2020-08-31 DIAGNOSIS — R79.89 LOW VITAMIN D LEVEL: ICD-10-CM

## 2020-08-31 DIAGNOSIS — E87.1 HYPONATREMIA: ICD-10-CM

## 2020-08-31 LAB
ANION GAP SERPL CALCULATED.3IONS-SCNC: 12 MMOL/L (ref 5–18)
BUN SERPL-MCNC: 6 MG/DL (ref 8–22)
CALCIUM SERPL-MCNC: 9.6 MG/DL (ref 8.5–10.5)
CHLORIDE BLD-SCNC: 98 MMOL/L (ref 98–107)
CO2 SERPL-SCNC: 23 MMOL/L (ref 22–31)
CREAT SERPL-MCNC: 0.55 MG/DL (ref 0.6–1.1)
GFR SERPL CREATININE-BSD FRML MDRD: >60 ML/MIN/1.73M2
GLUCOSE BLD-MCNC: 81 MG/DL (ref 70–125)
HGB BLD-MCNC: 12.9 G/DL (ref 12–16)
POTASSIUM BLD-SCNC: 4.1 MMOL/L (ref 3.5–5)
SODIUM SERPL-SCNC: 133 MMOL/L (ref 136–145)

## 2020-09-04 ENCOUNTER — TELEPHONE (OUTPATIENT)
Dept: ENDOCRINOLOGY | Facility: CLINIC | Age: 50
End: 2020-09-04

## 2020-09-04 NOTE — TELEPHONE ENCOUNTER
[facility] Call Center    Phone Message    May a detailed message be left on voicemail: yes     Reason for Call: Appointment Intake    Referring Provider Name: Referred by [facility]  Diagnosis and/or Symptoms: Hyponatremia     Diagnosis is not listed in our guidelines    Action Taken: Other: Endocrinology    Travel Screening: Not Applicable

## 2020-09-21 NOTE — TELEPHONE ENCOUNTER
Attempted call x2, VM box full. Pt can be scheduled as VIRTUAL VISIT NEW with any provider accepting new endocrine patients.

## 2020-09-23 ENCOUNTER — COMMUNICATION - HEALTHEAST (OUTPATIENT)
Dept: PHARMACY | Facility: CLINIC | Age: 50
End: 2020-09-23

## 2020-09-23 DIAGNOSIS — R56.9 SEIZURE (H): ICD-10-CM

## 2020-10-16 ENCOUNTER — COMMUNICATION - HEALTHEAST (OUTPATIENT)
Dept: INTERNAL MEDICINE | Facility: CLINIC | Age: 50
End: 2020-10-16

## 2020-11-10 ENCOUNTER — OFFICE VISIT - HEALTHEAST (OUTPATIENT)
Dept: INTERNAL MEDICINE | Facility: CLINIC | Age: 50
End: 2020-11-10

## 2020-11-10 DIAGNOSIS — I10 HYPERTENSION: ICD-10-CM

## 2020-11-10 DIAGNOSIS — E87.1 HYPONATREMIA: ICD-10-CM

## 2020-11-10 DIAGNOSIS — M75.42 IMPINGEMENT SYNDROME OF SHOULDER REGION, LEFT: ICD-10-CM

## 2020-11-10 DIAGNOSIS — R56.9 SEIZURE (H): ICD-10-CM

## 2020-11-10 DIAGNOSIS — E83.42 HYPOMAGNESEMIA: ICD-10-CM

## 2020-11-10 DIAGNOSIS — E55.9 VITAMIN D DEFICIENCY: ICD-10-CM

## 2020-11-10 LAB
ANION GAP SERPL CALCULATED.3IONS-SCNC: 15 MMOL/L (ref 5–18)
BUN SERPL-MCNC: 4 MG/DL (ref 8–22)
CALCIUM SERPL-MCNC: 9.6 MG/DL (ref 8.5–10.5)
CHLORIDE BLD-SCNC: 91 MMOL/L (ref 98–107)
CO2 SERPL-SCNC: 23 MMOL/L (ref 22–31)
CREAT SERPL-MCNC: 0.53 MG/DL (ref 0.6–1.1)
GFR SERPL CREATININE-BSD FRML MDRD: >60 ML/MIN/1.73M2
GLUCOSE BLD-MCNC: 78 MG/DL (ref 70–125)
MAGNESIUM SERPL-MCNC: 2.1 MG/DL (ref 1.8–2.6)
POTASSIUM BLD-SCNC: 4.7 MMOL/L (ref 3.5–5)
SODIUM SERPL-SCNC: 129 MMOL/L (ref 136–145)

## 2020-11-11 LAB
25(OH)D3 SERPL-MCNC: 80.8 NG/ML (ref 30–80)
25(OH)D3 SERPL-MCNC: 80.8 NG/ML (ref 30–80)

## 2020-11-16 ENCOUNTER — COMMUNICATION - HEALTHEAST (OUTPATIENT)
Dept: INTERNAL MEDICINE | Facility: CLINIC | Age: 50
End: 2020-11-16

## 2020-12-04 ENCOUNTER — COMMUNICATION - HEALTHEAST (OUTPATIENT)
Dept: PHARMACY | Facility: CLINIC | Age: 50
End: 2020-12-04

## 2020-12-04 DIAGNOSIS — R56.9 SEIZURE (H): ICD-10-CM

## 2021-05-26 ENCOUNTER — RECORDS - HEALTHEAST (OUTPATIENT)
Dept: ADMINISTRATIVE | Facility: CLINIC | Age: 51
End: 2021-05-26

## 2021-05-26 ASSESSMENT — PATIENT HEALTH QUESTIONNAIRE - PHQ9: SUM OF ALL RESPONSES TO PHQ QUESTIONS 1-9: 9

## 2021-05-28 ASSESSMENT — ANXIETY QUESTIONNAIRES: GAD7 TOTAL SCORE: 11

## 2021-05-28 NOTE — TELEPHONE ENCOUNTER
Refill Approved    Rx renewed per Medication Renewal Policy. Medication was last renewed on 4/5/18.    Ov: 10/29/18    Roseanna Martinez, [facility] Triage/Med Refill 5/5/2019     Requested Prescriptions   Pending Prescriptions Disp Refills     losartan (COZAAR) 50 MG tablet [Pharmacy Med Name: LOSARTAN 50MG TABLETS] 30 tablet 0     Sig: TAKE 1 TABLET(50 MG) BY MOUTH DAILY       Angiotensin Receptor Blocker Protocol Passed - 5/4/2019  3:54 AM        Passed - PCP or prescribing provider visit in past 12 months       Last office visit with prescriber/PCP: 10/29/2018 Chris South MD OR same dept: 10/29/2018 Chris South MD OR same specialty: 10/29/2018 Chris South MD  Last physical: Visit date not found Last MTM visit: Visit date not found   Next visit within 3 mo: Visit date not found  Next physical within 3 mo: Visit date not found  Prescriber OR PCP: Chris South MD  Last diagnosis associated with med order: 1. Hypertension  - losartan (COZAAR) 50 MG tablet [Pharmacy Med Name: LOSARTAN 50MG TABLETS]; TAKE 1 TABLET(50 MG) BY MOUTH DAILY  Dispense: 30 tablet; Refill: 0    If protocol passes may refill for 12 months if within 3 months of last provider visit (or a total of 15 months).             Passed - Serum potassium within the past 12 months     Lab Results   Component Value Date    Potassium 3.4 (L) 10/29/2018             Passed - Blood pressure filed in past 12 months     BP Readings from Last 1 Encounters:   10/29/18 148/82             Passed - Serum creatinine within the past 12 months     Creatinine   Date Value Ref Range Status   10/29/2018 0.53 (L) 0.60 - 1.10 mg/dL Final

## 2021-06-01 VITALS — WEIGHT: 115 LBS | HEIGHT: 63 IN | BODY MASS INDEX: 20.38 KG/M2

## 2021-06-01 VITALS — BODY MASS INDEX: 20.91 KG/M2 | WEIGHT: 118 LBS | HEIGHT: 63 IN

## 2021-06-01 VITALS — BODY MASS INDEX: 20.38 KG/M2 | WEIGHT: 115.04 LBS | HEIGHT: 63 IN

## 2021-06-02 VITALS — HEIGHT: 63 IN | BODY MASS INDEX: 21.09 KG/M2 | WEIGHT: 119 LBS

## 2021-06-04 VITALS
BODY MASS INDEX: 19.91 KG/M2 | DIASTOLIC BLOOD PRESSURE: 74 MMHG | WEIGHT: 116 LBS | HEART RATE: 101 BPM | OXYGEN SATURATION: 97 % | SYSTOLIC BLOOD PRESSURE: 132 MMHG

## 2021-06-04 VITALS
WEIGHT: 116 LBS | SYSTOLIC BLOOD PRESSURE: 138 MMHG | HEART RATE: 84 BPM | BODY MASS INDEX: 19.91 KG/M2 | DIASTOLIC BLOOD PRESSURE: 78 MMHG | OXYGEN SATURATION: 99 %

## 2021-06-05 VITALS
HEART RATE: 106 BPM | BODY MASS INDEX: 18.88 KG/M2 | SYSTOLIC BLOOD PRESSURE: 130 MMHG | WEIGHT: 110 LBS | TEMPERATURE: 98.2 F | OXYGEN SATURATION: 99 % | DIASTOLIC BLOOD PRESSURE: 68 MMHG

## 2021-06-08 NOTE — TELEPHONE ENCOUNTER
After multiple failed attempts over the last few days to reach this patient. We will be discarding this referral.    Fawad at [facility].

## 2021-06-08 NOTE — TELEPHONE ENCOUNTER
Provider Communication  Who is calling:  Alexa  [facility]   Reason for call: Alexa has been attempting to contact Ivon following hospitalization for case management but has been unsuccessful. Alexa is asking that pt be contacted for this and relay her info for pt to call.   Alexa page/ [facility] PH:[phone]  Urgency for return call:  as available  Okay to leave detailed message?:  Yes

## 2021-06-08 NOTE — TELEPHONE ENCOUNTER
PATIENT NAME:  Ivon Sheets  YOB: 1970  MRN: [id number]  SURGEON: Dr. Harry  DATE of CONSULT: 06/03/2020  Consult for traumatic hemorrhagic cranial contusions          FOLLOW-UP:  Hospital Follow Up Visit: 2 weeks  Post-op Provider: Telephone visit with NP  DIAGNOSTICS:  CT  REFERRAL to Neurology with Dr. Bolivar  DISPOSITION:  Home 06/04/2020    ADDITIONAL INSTRUCTIONS FOR MEDICAL STAFF:      Kirti Bernard RN, CNRN

## 2021-06-08 NOTE — TELEPHONE ENCOUNTER
RN cannot approve Refill Request    RN can NOT refill this medication Protocol failed and NO refill given.         Zulay Tucker, [facility] Triage/Med Refill 6/9/2020    Requested Prescriptions   Pending Prescriptions Disp Refills     metoprolol succinate (TOPROL-XL) 25 MG [Pharmacy Med Name: METOPROLOL ER SUCCINATE 25MG TABS] 90 tablet 3     Sig: TAKE 1 TABLET(25 MG) BY MOUTH DAILY       Beta-Blockers Refill Protocol Failed - 6/7/2020  5:08 PM        Failed - PCP or prescribing provider visit in past 12 months or next 3 months     Last office visit with prescriber/PCP: 10/29/2018 Chris South MD OR same dept: Visit date not found OR same specialty: 10/29/2018 Chris South MD  Last physical: Visit date not found Last MTM visit: Visit date not found   Next visit within 3 mo: Visit date not found  Next physical within 3 mo: Visit date not found  Prescriber OR PCP: Chris South MD  Last diagnosis associated with med order: 1. Hypertension  - metoprolol succinate (TOPROL-XL) 25 MG [Pharmacy Med Name: METOPROLOL ER SUCCINATE 25MG TABS]; TAKE 1 TABLET(25 MG) BY MOUTH DAILY  Dispense: 30 tablet; Refill: 7    If protocol passes may refill for 12 months if within 3 months of last provider visit (or a total of 15 months).             Passed - Blood pressure filed in past 12 months     BP Readings from Last 1 Encounters:   06/05/20 (!) 167/94

## 2021-06-08 NOTE — TELEPHONE ENCOUNTER
Left message to call back for: Ivon  Information to relay to patient:  Please relay message below from Alexa from .  Please have the patient contact Alexa as requested below.  Julieta HARLEY, TRISTIN/CMT....................11:14 AM

## 2021-06-09 NOTE — PROGRESS NOTES
"Ivon Sheets is a 49 y.o. female who is being evaluated via a billable telephone visit.      The patient has been notified of following:     \"This telephone visit will be conducted via a call between you and your physician/provider. We have found that certain health care needs can be provided without the need for a physical exam.  This service lets us provide the care you need with a short phone conversation.  If a prescription is necessary we can send it directly to your pharmacy.  If lab work is needed we can place an order for that and you can then stop by our lab to have the test done at a later time.    Telephone visits are billed at different rates depending on your insurance coverage. During this emergency period, for some insurers they may be billed the same as an in-person visit.  Please reach out to your insurance provider with any questions.    If during the course of the call the physician/provider feels a telephone visit is not appropriate, you will not be charged for this service.\"    Patient has given verbal consent to a Telephone visit? Yes    Patient would like to receive their AVS by my chart     Additional provider notes: Ivon is a 49 year old female follow up from hospital admission, S/P fall, seizure, hyponatremia, multiple cranial contusions, small SAH, history of alcohol abuse. CT today shows improvement and near resolution off areas of concern. Continues with small amount of edema and subacute blood products at site of previous hemorrhages. Slight improvement in right parietal scalp hematoma. Takes oxycodone 1 time a day. Denies dizziness. Denies nausea. Poor appetite at baseline. Is trying to establish relationship with primary care. Denies any significant complaints regarding concussion syndrome. She states she has no recall of the events of how she got to the hospital, why. She is annoyed she had a pregnancy test, reports being menopausal for 5 years. She has questions regarding " repeated head trauma through a life time. She has questions about her glucose. She wonders why her sodium is always low. Again, Recommend she establish care with primary care. She has continued her Keppra as instructed. Dr Bolivar's note documents indefinite keppra. Referred patient to calling neurology for questions regarding Keppra duration.     Due to high risk of further head trauma, on going edema recommend follow up CT in 4-6 weeks with telephone visit with NP.     Phone call duration: 25 minutes    Megan Benson CNP  [facility]   [phone]

## 2021-06-09 NOTE — TELEPHONE ENCOUNTER
Shannon called with some follow up questions for NP from her visit today, 6/23 she was admitted for head trauma and she wanted to know if she has a severe concussion? 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 She is also experiencing extreme irritability and was wondering if that is normal and does stress have any affect on her symtoms? Please call Ivon at [phone]

## 2021-06-09 NOTE — PATIENT INSTRUCTIONS - HE
1. Continue to avoid NSAIDS No Aspirin, Ibuprofen, Motrin, Advil, Aleve, Naproxen, or NSAIDs.    2. Call neurology re: Keppra for seizures    3. Call insurance company to obtain PCP name and number    4. Return to clinic in 4 - 6 weeks with new head CT    5. Call [phone] with the following symptoms:  *Worsening headache not relieved by the prescription given  *A headache that gets better or worse when you stand up or lie down  *Seizures  *Persistent nausea and vomiting  *Increased sleepiness or difficulty being awakened  *Change in ability to walk, see, think, or talk  *Worsening or new onset of weakness, or numbness and tingling  *Loss or change in your ability to control bowel or bladder function    6. OK to advance your activity. If you become symptomatic avoid those activities.

## 2021-06-10 NOTE — TELEPHONE ENCOUNTER
Authorizing: Allyn Watt, JAMES in [facility]             Diagnosis: Hyponatremia [E87.1]     Please review and advise on if okay to schedule with Julieta or if patient should be seen by MD Endocrine

## 2021-06-10 NOTE — PROGRESS NOTES
"This patient is being evaluated via a billable telephone visit.      The patient has been notified of following:     \"This telephone visit will be conducted via a call between you and your physician/provider. We have found that certain health care needs can be provided without the need for a physical exam.  This service lets us provide the care you need with a short phone conversation.  If a prescription is necessary we can send it directly to your pharmacy.  If lab work is needed we can place an order for that and you can then stop by our lab to have the test done at a later time.    Telephone visits are billed at different rates depending on your insurance coverage. During this emergency period, for some insurers they may be billed the same as an in-person visit.  Please reach out to your insurance provider with any questions.    If during the course of the call the physician/provider feels a telephone visit is not appropriate, you will not be charged for this service.\"    Patient has given verbal consent to a Telephone visit? Yes    Reason for visit:  Hospital follow-up visit for SAH, multiple cranial contusions    Orig consult 6/3/20. Pt hx etoh abuse had fall w seizure. Had hyponatremia, multiple cranial contusions, small SAH on head CT. Of note, she has has had 3 seizures in the last 6yrs.    Improving at last visit on 6/23. Head CT 4-6wks recommended. Was trying to establish relationship with primary care.     Subjective:  Pt c/o dizziness in bed at night when turns on her R side. Does not notice any symptoms during the day. No headaches. Taking keppra twice daily.    Imaging:  Personally reviewed images and radiologist impressions  EXAM: CT HEAD WO CONTRAST  LOC[facility]  DATE/TIME: 7/24/2020 11:42 AM     INDICATION: cranial contusions; SAH;  COMPARISON: Head CT 06/19/2020.  TECHNIQUE: Routine without IV contrast. Multiplanar reformats. Dose reduction techniques were " used.     FINDINGS:  Interval resolution of previously demonstrated multi compartmental hemorrhages. No evidence of new acute intracranial hemorrhage or mass effect. Scattered foci of decreased attenuation within the cerebral hemispheric white matter which are nonspecific,   though most commonly ascribed to chronic small vessel ischemic disease. The ventricles and sulci are prominent consistent with mild brain parenchymal volume loss. Gray-white matter differentiation is maintained. The basilar cisterns are patent.     The partially imaged globes are unremarkable. The partially imaged paranasal sinuses, mastoid air cells and middle ear cavities are unremarkable. The visualized skull base and calvarium are unremarkable. Near-complete resolution of right parietal scalp   hematoma.     IMPRESSION:   1.  Interval resolution of previously described demonstrated multi compartmental hemorrhages.  2.  No evidence of new acute intracranial hemorrhage or mass effect.  3.  Near complete resolution of right parietal scalp hematoma.    Diagnosis:  Subarachnoid hemorrhage  Brain contusion     Assessment/Plan:  Resolution of multi compartmental hemorrhages.  Dizziness at night ?r/t post concussive type sx.  Continue keppra  Referred to neurology for symptoms, also keppra mgmt given her hx multiple seizures in last 6yrs.   She may also choose to seek care at [facility].  Referred to endocrinology given her hx severe hyponatremia (117).  No need for further neurosurgery visits or head CTs     I have reviewed and updated the patient's Past Medical History, Social History, Family History and Medication List.  Patient's allergies were reviewed.    Phone call duration: 30 minutes    Allyn Watt [facility]  O. [phone]

## 2021-06-10 NOTE — PROGRESS NOTES
[facility] Clinic Note  Ivon Sheets   50 y.o. female    Date of Visit: 8/3/2020  Chief Complaint   Patient presents with     [facility]     Hospital Visit Follow Up     Alonso Joes 6/2-6/5     Assessment/Plan  1. Hyponatremia  Likely multifactorial, with poor PO intake and beer potomania. Not on any diuretics or other Rx that would cause this.   - Basic Metabolic Panel    2. Seizure disorder (H)  Reviewed inpatient hospital notes and discharge summary.  I will reach out to specialty scheduling regarding setting up that neurology and endocrine follow-up ASAP.  We will also check Keppra level, sodium level and resume thiamine supplement  - Basic Metabolic Panel  - thiamine 100 MG tablet; Take 1 tablet (100 mg total) by mouth daily.  Dispense: 100 tablet; Refill: 0  - Levetiracetam [Keppra ]    3. Encounter for screening colonoscopy  - Ambulatory referral for Colonoscopy    4. Encounter for screening mammogram for breast cancer  Patient notes she has never undergone a screening mammogram.  To note she is also due for a Pap smear which we can address in a follow-up visit  - Mammo Screening Bilateral; Future    5. Essential hypertension  Not optimally controlled.  Will continue taking metoprolol for now however I am unsure why she is on this medication.  Want to avoid any medication that affects renal function or electrolytes and thus will start with amlodipine.  Plan to follow-up in 1 month  - amLODIPine (NORVASC) 10 MG tablet; Take 0.5 tablets (5 mg total) by mouth daily.  Dispense: 30 tablet; Refill: 0    6. Current moderate episode of major depressive disorder, unspecified whether recurrent (H)  Counseled that she needs to follow-up with her therapist as soon as possible.  YOUSUF 7 of 11 and PHQ 9 of 9.  We will follow-up in 1 month on her mood and likely will place referral for psychiatry    Much or all of the text in this note was generated through the use of Dragon Dictate voice-to-text  software. Errors in spelling or words which seem out of context are unintentional. Sound alike errors, in particular, may have escaped editing  Aden Nunes MD    Post Discharge Medication Reconciliation Status: discharge medications reconciled, continue medications without change    Return in 4 weeks (on 8/31/2020), or if symptoms worsen or fail to improve.    Subjective  This 50 y.o. old female. Patient of Dr. South.  History of mood disorder, cannabis and polysubstance abuse, hypertension, migraines, chronic alcohol dependence, tobacco use, stress cardiomyopathy, depression, seizures.  Was recently hospitalized from 6/2/2020-6/5/2020 for 2 witnessed seizures in the setting of hitting the back right side of her head.  CT head shows small bilateral parietal and small right temporal parenchymal hemorrhages and a minimal subarachnoid hemorrhage in the right sylvian fissure consistent with posttraumatic changes.  Treated with IV Keppra (in part for alcohol withdrawals), magnesium oxide, omeprazole and thiamine.  And admitted to the ICU.  Intubated for airway protection.  Discharged more neurologically intact and cleared by psychiatry with plans to follow-up as an outpatient for chemical dependence, in addition to follow-up with neurosurgery/neurology in 3-4 weeks from the date of discharge.  Discharged on Keppra, magnesium oxide, omeprazole and thiamine. Repeat CT scan showed near resolution of the original findings. Since discharge, no more EtOH and denies any seizures. States she is trying to stay well hydrated. Lives with former partner.     Wants to make sure her metoprolol is meant to be continued. Wants to see a neurologist at [facility] as her father was treated for brain cancer there. Notes that this is the third seizure in the lat 6 years. 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 3 years ago, was at a family picnic and thus fell over. States  that she drinks a lot of beer and does not eat much. States that she has some dental issues that are affecting her ability to eat.   Smokes drags of cigarettes. Does not take too much salt. She takes Keppra at 9 am and around 7 pm daily. Also wants to know why she is on omeprazole when she does not have a history of acid reflux. She does not know what she was on ventilator. No hemoptysis or hemotochezia. Does report some discomfort/itch in her throat. PHQ9 of 9, GAD7 of 11.   Wants to know what else she can take for blood pressure. States she did not tolerate Losartan. TTE in 2014 was unremarkable, with EF 60%    ROS A comprehensive review of systems was performed and was otherwise negative    Medications, allergies, and problem list were reviewed and updated    Exam  General appearance: Pleasant, nontoxic-appearing, no acute distress, alert and oriented x4, underweight/thin  Vitals:    08/03/20 1540   BP: 138/78   Pulse: 84   SpO2: 99%     Vitals:    08/03/20 1531 08/03/20 1540   BP: 150/78 138/78   Patient Site: Right Arm Right Arm   Patient Position: Sitting Sitting   Cuff Size: Adult Regular Adult Regular   Pulse: 84    SpO2: 99%    Weight: 116 lb (52.6 kg)    EYES: Eyelids, conjunctiva, and sclera were normal.  Mild left ptosis   HEAD, EARS, NOSE, MOUTH, AND THROAT: Head and face were normal. Hearing was normal to voice.  NECK: Neck appearance was normal.   RESPIRATORY: Bilaterally with no crackles, wheezing or rhonchi  CARDIOVASCULAR: Regular S1 and S2.  Radial pulses intact.  No edema.  GASTROINTESTINAL: NABS, soft, nontender, nondistended, no hepatomegaly  MUSCULOSKELETAL: Skeletal configuration was normal and muscle mass was normal for age.   SKIN/HAIR/NAILS: Skin color was normal.    NEUROLOGIC: Alert and oriented to person, place, time, and circumstance. Speech was normal. Cranial nerves were normal. Motor strength was normal for age   PSYCHIATRIC:  Mood and affect were flat.  Poor eye contact. The  patient's judgment and insight were normal.  Additional Information   Current Outpatient Medications   Medication Sig Dispense Refill     HYDROcodone-acetaminophen 5-325 mg per tablet Take 1-2 tablets by mouth every 6 (six) hours as needed for pain.       levETIRAcetam (KEPPRA) 500 MG tablet Take 1 tablet (500 mg total) by mouth 2 (two) times a day. 60 tablet 0     magnesium oxide (MAG-OX) 400 mg (241.3 mg magnesium) tablet Take 1 tablet (400 mg total) by mouth daily.  0     metoprolol succinate (TOPROL-XL) 25 MG Take 1 tablet (25 mg total) by mouth daily. 30 tablet 7     amLODIPine (NORVASC) 10 MG tablet Take 0.5 tablets (5 mg total) by mouth daily. 30 tablet 0     thiamine 100 MG tablet Take 1 tablet (100 mg total) by mouth daily. 100 tablet 0     No current facility-administered medications for this visit.      No Known Allergies  Social History     Social History Narrative     Not on file     Social History     Tobacco Use     Smoking status: Current Every Day Smoker     Years: 26.00     Smokeless tobacco: Never Used   Substance Use Topics     Alcohol use: Yes     Comment: 3-4 beers/day     Drug use: Yes     Comment: marijuana     Family History   Problem Relation Age of Onset     Coronary artery disease Father      Alcohol abuse Brother      Past Surgical History:   Procedure Laterality Date     EYE SURGERY     Time: total time spent with the patient was 40 minutes of which >50% was spent in counseling and coordination of care

## 2021-06-10 NOTE — PATIENT INSTRUCTIONS - HE
We need to set up an appointment with Endocrinology and neurology ASAP    Please reconnect with your therapist regarding your mood    Try to resume, jimbo with your dogs, your walks, as it is good for your   mental and physical health    I have sent a Rx for thiamine (100 mg daily) to your pharmacy    We will check your electrolyte and keppra level today    I have discontinued the omeprazole from your medication list    In am starting you on amlodipine 5 mg daily to add to the metoprolol. I would like to see you in 1 month to check your BP and assess whether further changes can be made. Cutting down on smoking will also help lower your blood pressure.     I placed a referral for a screening colonoscopy and mammogram

## 2021-06-13 NOTE — PROGRESS NOTES
[facility]  [facility]   50 y.o. female    Date of Visit: 11/10/2020  Chief Complaint   Patient presents with     Shoulder Pain     LT shoulder - possible pinched nerve     Follow-up     4 month routine     Fall     Last Friday at work - injured foot - seeing work comp     Assessment/Plan  1. Hypertension  Much better controlled compared to when last checked.  Refill metoprolol today.  We will continue to take the amlodipine as prescribed.  Counseled on decreasing tobacco use in addition to decreasing her salt intake.  She noted that she had taken Wellbutrin and Chantix in the past with minimal improvement and significant side effects respectively.    - metoprolol succinate (TOPROL-XL) 25 MG; Take 1 tablet (25 mg total) by mouth daily.  Dispense: 90 tablet; Refill: 3  - Basic Metabolic Panel    2. Seizure (H)  - levETIRAcetam (KEPPRA) 500 MG tablet; Take 1 tablet (500 mg total) by mouth 2 (two) times a day.  Dispense: 180 tablet; Refill: 1    3. Hypomagnesemia  5. Hyponatremia  We will continue taking magnesium supplements.  Unsure if this is related to her periodic cramping.  Differential includes RLS, claudication and PLMD.  RLS less likely with absence of renal dysfunction or anemia. Provided her with patient instructions on potential etiologies and remedies she can do to address her symptoms.  - Magnesium    4. Vitamin D deficiency  She will continue taking vitamin D supplements.  - Vitamin D, Total (25-Hydroxy)     6. Impingement syndrome of shoulder region, left  Reviewed clavicle fracture imaging report with her indicating minimal inferior displacement and angulation on the lateral fragment of the distal left clavicular fracture.  Concern for potential radicular symptoms in the setting of impingement from this injury when resting on that side.  Not interested in pursuing orthopedic eval imaging at this time.  I counseled her to please abstain if possible from  rest the on the left side is much as possible.  If symptoms worsen, then would have a low threshold to send to Ortho versus PT for further eval.    Much or all of the text in this note was generated through the use of Dragon Dictate voice-to-text software. Errors in spelling or words which seem out of context are unintentional. Sound alike errors, in particular, may have escaped editing  Aden Nunes MD    Return in about 6 months (around 5/10/2021), or if symptoms worsen or fail to improve, for Follow up; pap smear.    Subjective  This 50 y.o. old female. Tripped at work last week on 11/6/2020. Filed a workman's comp. Injured the right lower leg. Walking into work down the parking ramp into the building. Slipped and landed on her arms. Felt okay for the first day. Now has a walking boot for a severe strain, with a possible torn ligament and a tiny calcaneal fracture at [facility].   Still experiencing a lot of cramping at night in her feet and leg.   Also c/o of bilateral leg and foot cramping at night when laying down. Is on 400 mg magnesium daily. Massage makes it better. Getting up and moving makes it better. Hgb 12.6 in 8/2020 and no history of kidney disease.   She broke her shoulder this summer and now has a pain when laying on the left side. The entire arm goes numb when she lays on the side.  No seizures since lacks activity and does not believe her seizure history is related to the fall.  No decrease in range of motion or weakness.    ROS A comprehensive review of systems was performed and was otherwise negative    Medications, allergies, and problem list were reviewed and updated    Exam  General appearance: Pleasant, nontoxic-appearing, no acute distress, alert and oriented x4, thin  Vitals:    11/10/20 1422   BP: 130/68   Pulse: (!) 106   Temp: 98.2  F (36.8  C)   SpO2: 99%   RESPIRATORY: Bilaterally with no crackles, wheezing or rhonchi  CARDIOVASCULAR: Tachycardic but regular.  Radial  pulses intact.  No edema.  GASTROINTESTINAL: NABS, soft, NT, ND  MUSCULOSKELETAL: Skeletal configuration was normal and muscle mass was normal for age.  Right foot boot. No decreased range of motion with left shoulder.  SKIN/HAIR/NAILS: Skin color was normal.  Healing scab on the left anterior knee.  NEUROLOGIC: Alert and oriented to person, place, time, and circumstance. Speech was normal. Motor strength was normal for age   PSYCHIATRIC:  Mood and affect were normal and the patient had normal recent and remote memory.   Additional Information   Current Outpatient Medications   Medication Sig Dispense Refill     amLODIPine (NORVASC) 10 MG tablet TAKE 1 TABLET BY MOUTH EVERY DAY 90 tablet 0     [START ON 11/18/2020] cholecalciferol, vitamin D3, (VITAMIN D3) 50 mcg (2,000 unit) Tab Take 1 tablet (2,000 Units total) by mouth daily. 100 tablet 2     ergocalciferol (ERGOCALCIFEROL) 1,250 mcg (50,000 unit) capsule Take 1 capsule (50,000 Units total) by mouth once a week for 12 doses. 12 capsule 0     HYDROcodone-acetaminophen 5-325 mg per tablet Take 1-2 tablets by mouth every 6 (six) hours as needed for pain.       levETIRAcetam (KEPPRA) 500 MG tablet Take 1 tablet (500 mg total) by mouth 2 (two) times a day. 180 tablet 1     magnesium oxide (MAG-OX) 400 mg (241.3 mg magnesium) tablet Take 1 tablet (400 mg total) by mouth daily.  0     metoprolol succinate (TOPROL-XL) 25 MG Take 1 tablet (25 mg total) by mouth daily. 90 tablet 3     thiamine 100 MG tablet Take 1 tablet (100 mg total) by mouth daily. 100 tablet 0     No current facility-administered medications for this visit.      No Known Allergies  Social History     Social History Narrative     Not on file     Social History     Tobacco Use     Smoking status: Current Every Day Smoker     Years: 26.00     Smokeless tobacco: Never Used   Substance Use Topics     Alcohol use: Yes     Comment: 3-4 beers/day     Drug use: Yes     Comment: marijuana     Family History    Problem Relation Age of Onset     Coronary artery disease Father      Alcohol abuse Brother      Past Surgical History:   Procedure Laterality Date     EYE SURGERY     Time: total time spent with the patient was 25 minutes of which >50% was spent in counseling and coordination of care

## 2021-06-16 NOTE — PROGRESS NOTES
Call    Your sodium is low as in the past  No real change  Due to alcohol  Decrease amount  and this will help  Keep return appt as scheduled

## 2021-06-16 NOTE — PROGRESS NOTES
LEFT MESSAGE FOR PT TO ARRIVE 15 MIN BEFORE APPOINTMENT AND TO BRING PHOTO ID AND INSURANCE CARD   6.5

## 2021-06-16 NOTE — PROGRESS NOTES
HISTORY/PHYSICAL EXAM  Ivon Sheets   47 y.o. female  Is here for a physical healthcare maintenance examination        Assessment/Plan for  Ivon Sheets is a 47 y.o. female.       1.  Healthcare maintenance examination  2.  Hypertension severe.  Needs to discontinue alcohol  Med compliance  New Rx metoprolol Exar daily  Clinic follow-up 1 month  3.  Urinary incontinence  Drinks a lot of caffeine/alcohol.  She is postmenopausal  I will have her see GYN Dr. Nyla March    4.  Tobacco dependence-suggest she quit smoking  5.  Postmenopausal  Cancer screening-mammogram, colonoscopy.  Declines colonoscopy.  Discussed cola guard-agreeable  6.  Chronic depression-seen psychologist and soon update with psychiatrist    Plan:  1.  Laboratory ECG  2.  Mammogram: Guard  3.  Metoprolol 25 low-dose  4.  GYN referral      I have counseled the patient for tobacco cessation and the follow up will occur  at the next visit.    There are no Patient Instructions on file for this visit.    Diagnoses and all orders for this visit:    Annual physical exam  -     HM1(CBC and Differential)  -     Erythrocyte Sedimentation Rate  -     Urinalysis-UC if Indicated  -     Basic Metabolic Panel  -     Hepatic Profile  -     Cholesterol, Total  -     Thyroid Cascade  -     Vitamin D, Total (25-Hydroxy)  -     HM1 (CBC with Diff)    Visit for screening mammogram  -     Mammo Screening Bilateral; Future; Expected date: 2/21/18    Bladder incontinence    Postmenopausal    Hypertension  -     Electrocardiogram Perform - Clinic  -     metoprolol succinate (TOPROL XL) 25 MG; Take 1 tablet (25 mg total) by mouth daily.  Dispense: 30 tablet; Refill: 11    Chronic depression    Tobacco dependence  -     XR Chest 2 Views; Future; Expected date: 2/21/18    Colon cancer screening  -     Cologuard    Urge incontinence  -     Ambulatory referral to Obstetrics / Gynecology            Medications:  Medications after visit  Current Outpatient  Prescriptions   Medication Sig Dispense Refill     metoprolol succinate (TOPROL XL) 25 MG Take 1 tablet (25 mg total) by mouth daily. 30 tablet 11     No current facility-administered medications for this visit.        This provider spent greater than 15 min. face-to-face time with the patient and/or his family.  More than half this time was spent in counseling and or coordination of care with other providers or agencies which were consistent with the nature of this patient's problems which are listed and described in the assessment and plan.       Chris South MD  Internal medicine  [facility]  [phone]  [email or web address]      This is an electronically verified report by Chris South M.D.  (Note created with Dragon voice recognition and unintended spelling errors and word substitutions may occur)        SUBJECTIVE/HPI    Chief Complaint:  Annual Exam (Non fasting) and Orders Request (Mammogram orders placed)  Here for physical evaluation  Have not seen since 2014  Continues to drink alcohol in significant amounts smoking using marijuana  Blood pressure is high at home 150-175 systolic over 80s-90s  She is not taking her lisinopril      She drinks significant alcohol-.  She smokes marijuana  She smokes about half pack cigarettes a day    She was hospitalized with a cardiomyopathy  She had normal coronary arteries with slightly decreased ejection fraction  She has not had any stroke symptomatology            Review of Systems:   Extensive 14-point comprehensive review of systems was performed.   Patient reports  1.  Urinary frequency.  Small amounts  No hematuria burning etc.  Is postmenopausal for about 2 years she notes    Otherwise, the following systems are negative including constitutional, eyes, ears, nose and throat, cardiovascular, respiratory, gastrointestinal, genitourinary, musculoskeletal,neurological, skin and/or breast, endocrine, hematologic/lymph,  "allergic/immunologic and psychiatric.  Surgical History  Past Surgical History:   Procedure Laterality Date     EYE SURGERY         Medical History     Past Medical History:   Diagnosis Date     Alcohol dependence      Hyponatremia      Polysubstance (excluding opioids) dependence, daily use      Takotsubo cardiomyopathy      Tobacco use disorder      Patient Active Problem List    Diagnosis Date Noted     Chronic depression 02/21/2018     Syncope 09/05/2014     Hyponatremia 09/05/2014     Cardiomyopathy, stress (angio ef 35%) 09/05/2014     Scalp laceration 09/05/2014     Marijuana use 09/05/2014     Alcohol dependence      Tobacco use disorder      Takotsubo cardiomyopathy         Family History  Family History   Problem Relation Age of Onset     Coronary artery disease Father      Alcohol abuse Brother              Medications:  No current outpatient prescriptions on file prior to visit.     No current facility-administered medications on file prior to visit.           Allergies:  No Known Allergies    PSFHx:   Social History     Social History     Marital status: Single     Spouse name: N/A     Number of children: 0     Years of education: N/A     Occupational History     legislative      figure skating teacher      Social History Main Topics     Smoking status: Current Every Day Smoker     Years: 26.00     Smokeless tobacco: Never Used     Alcohol use Yes      Comment: 3-4 beers/day     Drug use: Yes      Comment: marijuana     Sexual activity: Not on file     Other Topics Concern     Not on file     Social History Narrative               Objective:   BP (!) 196/102 (Patient Site: Right Arm, Patient Position: Sitting, Cuff Size: Adult Regular) Comment: Patient's home machine  Pulse 84  Resp 16  Ht 5' 2.75\" (1.594 m)  Wt 115 lb 0.6 oz (52.2 kg)  LMP 02/21/2016 (Approximate)  SpO2 96%  BMI 20.54 kg/m2  Weight:   Wt Readings from Last 3 Encounters:   02/21/18 115 lb 0.6 oz (52.2 kg)   11/01/14 118 lb (53.5 " kg)   09/05/14 120 lb (54.4 kg)       General-appears older than stated age, no acute distress.  No tremor  Tobacco on breath  Thin woman  Skin: Normal. No rash or lesion.  No petechiae  Lymph Nodes: None palpable-including neck, axilla, inguinal, epitrochlear.  Head:  Normocephalic.    Eyes: Midline.  Equal size., full ROM.  External exams normal.  No icterus  Ears:  Normal pinnae, canals, and TM's.    Nose:  Patent, without deformity.    Throat:  Moist mucous membranes without lesions, erythema, or exudate.    Neck: No palpable masses, lymphadenopathy or tenderness.No thyromegaly or goiter.  No thyroid nodule.  Carotid Arteries:  No Bruit.  Carotid upstroke normal  Chest Wall: No deformity or pain elicited on compression.  No adventitious sounds no dullness at bases  Respiratory:  Normal respiratory effort.  Lungs are clear with good breath sounds.  No dullness.  No wheezing.  Heart: Regular rhythm.  Normal sounding S1, S2 without S3, S4, murmurs, rubs, or gallops.    Abdomen:  The abdomen was flat, soft and nontender without guarding rebound or masses.  There are normal bowel sounds.  There is no hepatosplenomegaly.  There is no palpable enlargement of the aorta.  GYN/rectal not done  Extremities:  Full ROM without limitation, deformity or edema.    4+ pulses  Neurologic-intact- No focal deficit.  Speech clear.  Coordination normal.  Strength symmetric.  Gait normal.  No tremor  Orthopedic-no arthropathy.          Laboratory: Ordered  Recent Results (from the past 24 hour(s))   Electrocardiogram Perform - Clinic   Result Value Ref Range    SYSTOLIC BLOOD PRESSURE  mmHg    DIASTOLIC BLOOD PRESSURE  mmHg    VENTRICULAR RATE 74 BPM    ATRIAL RATE 74 BPM    P-R INTERVAL 150 ms    QRS DURATION 88 ms    Q-T INTERVAL 370 ms    QTC CALCULATION (BEZET) 410 ms    P Axis 77 degrees    R AXIS 66 degrees    T AXIS 60 degrees    MUSE DIAGNOSIS       Normal sinus rhythm  Normal ECG  When compared with ECG of 04-SEP-2014  22:06,  Vent. rate has decreased BY  40 BPM  Nonspecific T wave abnormality no longer evident in Inferior leads  T wave inversion no longer evident in Anterolateral leads         RADIOLOGY: No results found.    Radiology:      Electrocardiogram: Ordered

## 2021-06-16 NOTE — PROGRESS NOTES
Call    Na low   No change  Need to decrese etoh  This  Will help raise na and you will feel better    Keep return appt  Will recheck at that time

## 2021-06-17 NOTE — PROGRESS NOTES
Ivon your bones are fairly thin  You are at increased risk for fracture  Taking vitamin D and calcium is important  Alcohol dramatically thins bone and predisposes a person the fracture    We will discuss more at your next appointment  Chris South MD  Internal medicine  [facility]  [phone]  [email or web address]

## 2021-06-17 NOTE — PROGRESS NOTES
OFFICE VISIT NOTE  Ivon Sheets   47 y.o. female            Assessment/Plan for  Ivon Sheets is a 47 y.o. female.  No Patient Care Coordination Note on file.       1. Hypertension  Definite improved control.  Given the fact that she is so cold will not increase beta-blocker  Felt dizzy on ACE.  Will try low-dose ARB  Avoid diuretic with her history of hyponatremia  - losartan (COZAAR) 50 MG tablet; Take 1 tablet (50 mg total) by mouth daily.  Dispense: 30 tablet; Refill: 11    2. Hyponatremia  We will recheck.  Patient does not have cirrhosis  She continues to drink significant amounts of blood light and tab  - Renal Function Profile    3. Chronic depression  Discussion.  Not on Rx  Scooter her dog   4.  Foot pain, low vitamin D, history of foot fracture.  Will check bone density.  Continue vitamin D 1000 international units.  Check PTH.  Check celiac panel.    5.  Elevated BNP.  Low ejection fraction on echo.  Angiogram showed normal EF with normal coronaries for years ago.  Recheck BNP.  6.  Peripheral sensory neuropathy.  We are checking her  B12.     Plan:  As above    Rx  Laboratory  Bone density  Limit tobacco  Limit alcohol  Clinic visit follow-up 6 weeks    There are no Patient Instructions on file for this visit.    Diagnoses and all orders for this visit:    Hypertension  -     losartan (COZAAR) 50 MG tablet; Take 1 tablet (50 mg total) by mouth daily.  Dispense: 30 tablet; Refill: 11    Hyponatremia  -     Renal Function Profile    Chronic depression    Postmenopausal  -     DXA Bone Density Scan; Future; Expected date: 18  -     Cancel: Celiac(Gluten)Antibody Panel    Fractures  -     DXA Bone Density Scan; Future; Expected date: 18  -     Cancel: Celiac(Gluten)Antibody Panel    Elevated brain natriuretic peptide (BNP) level  -     BNP(B-type Natriuretic Peptide)    Cardiomyopathy, stress (angio ef 35%)    Low vitamin D level  -     Celiac(Gluten)Antibody Panel  -     Parathyroid  "Hormone Intact    Peripheral sensory neuropathy  -     Vitamin B12        Medications after visit  Current Outpatient Prescriptions   Medication Sig Dispense Refill     cholecalciferol, vitamin D3, 1,000 unit capsule Take 1,000 Units by mouth daily.       hydrOXYzine pamoate (VISTARIL) 25 MG capsule Take 25 mg by mouth 3 (three) times a day as needed.        metoprolol succinate (TOPROL XL) 25 MG Take 1 tablet (25 mg total) by mouth daily. 30 tablet 11     losartan (COZAAR) 50 MG tablet Take 1 tablet (50 mg total) by mouth daily. 30 tablet 11     No current facility-administered medications for this visit.                       Chris South MD  Internal medicine  [facility]  [phone]  [email or web address]    Much or all of the text in this note was generated through the use of Dragon Dictate voice-to-text software. Errors in spelling or words which seem out of context are unintentional.   Sound alike errors, in particular, may have escaped editing.                 Subjective:   Chief Complaint:  Hypertension; Follow-up; and Foot Injury (Left)    Patient is sad  Dog -\"scooter-16 years  Ended long-term relationship-this was positive.  Work going okay  Not on antidepressants-discussion     tolerating beta-blocker but cold  No headache  Hypertension-There are no cardiovascular, respiratory, neurologic complaints.No claudication.There is no orthostasis.Patient is compliant with medications.  Medications reviewed.   No side effects from medication.    Weight is stable  Appetite is excellent  Contains high amounts of sodium she notes    Low vitamin D significant  History of foot fractures  No family history of celiac or hyperparathyroidism  Postmenopausal  No GI symptoms  Hyponatremia low vitamin D.    Review of Systems:     Extensive 10-point review of systems was performed. Please see the HPI for problem specific pertinent review of systems.     Patient does note continues " "to drink a lot of tab  Continues to drink blood light  Continues to smoke    Otherwise, the following systems are noncontributory including constitutional, eyes, ears, nose and throat, cardiovascular, respiratory, gastrointestinal, genitourinary, musculoskeletal,neurological, skin and/or breast, endocrine, hematologic/lymph, allergic/immunologic and psychiatric.              Medications:  Current Outpatient Prescriptions on File Prior to Visit   Medication Sig     metoprolol succinate (TOPROL XL) 25 MG Take 1 tablet (25 mg total) by mouth daily.     No current facility-administered medications on file prior to visit.             Allergies:No Known Allergies    PSFHx: Tobacco Status:  She  reports that she has been smoking.  She has smoked for the past 26.00 years. She has never used smokeless tobacco.   Alcohol Status:    History   Alcohol Use     Yes     Comment: 3-4 beers/day       reports that she has been smoking.  She has smoked for the past 26.00 years. She has never used smokeless tobacco. She reports that she drinks alcohol. She reports that she uses illicit drugs.    Objective:    /68  Pulse 76  Ht 5' 2.75\" (1.594 m)  Wt 115 lb (52.2 kg)  SpO2 99%  BMI 20.53 kg/m2  Weight:   Wt Readings from Last 3 Encounters:   04/05/18 115 lb (52.2 kg)   02/21/18 115 lb 0.6 oz (52.2 kg)   11/01/14 118 lb (53.5 kg)     BP Readings from Last 3 Encounters:   04/05/18 148/68   02/21/18 (!) 196/102   11/01/14 (!) 167/94         General-appears well, no acute distress.  Vitals:    04/05/18 0831 04/05/18 0930   BP: 150/82 148/68   Patient Site: Left Arm    Patient Position: Sitting    Cuff Size: Adult Regular    Pulse: 76    SpO2: 99%    Weight: 115 lb (52.2 kg)    Height: 5' 2.75\" (1.594 m)      Pulses regular.  70.  Lungs clear  No murmur  No edema      Review of clinical lab tests  Lab Results   Component Value Date    WBC 6.0 02/21/2018    HGB 13.4 02/21/2018    HCT 40.2 02/21/2018     02/21/2018    CHOL " 253 (H) 02/21/2018    ALT 34 02/21/2018    AST 48 (H) 02/21/2018     (L) 02/21/2018    K 4.3 02/21/2018    CL 94 (L) 02/21/2018    CREATININE 0.56 (L) 02/21/2018    BUN 4 (L) 02/21/2018    CO2 24 02/21/2018    TSH 2.17 02/21/2018    INR 0.98 09/05/2014         Vitamin D, Total (25-Hydroxy)   Date Value Ref Range Status   02/21/2018 5.4 (L) 30.0 - 80.0 ng/mL Final         Glucose   Date/Time Value Ref Range Status   02/21/2018 03:55 PM 77 70 - 125 mg/dL Final   11/01/2014 02:45 PM 82 70 - 125 mg/dL Final   09/05/2014 09:51 AM 76 70 - 125 mg/dL Final     Comment:       Fasting Glucose reference range is 70-99 mg/dL per  American Diabetes Association (ADA) guidelines.   09/04/2014 10:06  70 - 125 mg/dL Final     Comment:       Fasting Glucose reference range is 70-99 mg/dL per  American Diabetes Association (ADA) guidelines.     No results found for this or any previous visit (from the past 24 hour(s)).    RADIOLOGY: No results found.    Review of recent consultation-angiogram revealed normal ejection fraction normal coronary arteries  Patient had a significant rise in BNP-never rechecked.

## 2021-06-17 NOTE — TELEPHONE ENCOUNTER
Telephone Encounter by Aysha Weiner LPN at 6/16/2020 11:23 AM     Author: Aysha Weiner LPN Service: -- Author Type: Licensed Nurse    Filed: 6/16/2020 11:25 AM Encounter Date: 6/15/2020 Status: Signed    : Aysha Weiner LPN (Licensed Nurse)       Patient Returning Call  Reason for call:  Patient returning call   Information relayed to patient:  Leyda Barber, RN   to [facility] Scheduling Registration Pool            6/15/20 3:52 PM   Needs to estab care with one of the new docs at [facility]  if [facility] is no longer seeing pts.     Patient has additional questions:  Yes  If YES, what are your questions/concerns:  Patient states she needs a referral patient refused to give further information to writer she requested a call from clinic nurse to discuss directly .  Okay to leave a detailed message?: No

## 2021-06-18 NOTE — PROGRESS NOTES
OFFICE VISIT NOTE            Assessment/Plan for  Ivon Sheets is a 47 y.o. female.  No Patient Care Coordination Note on file.       1.  Hypertension-needs improved control.  Stressed compliance  2.  Unilateral hearing loss-clinically-refer to ENT  3.  Osteopenia possibly osteomalacia with low vitamin D of 5.  Will increase her vitamin D to 5000 units daily.  Compliant with calcium.  Recheck bone density in about 12 months if compliant  4.  Hyponatremia-recheck  5.  Alcoholism.  Recommends drinking cessation  6.  Chronic anxiety depression-seeing counselor-good rapport    Plan:  Continue current Rx  Increase vitamin D 5000 units  Recheck laboratory-sodium vitamin D  Clinic follow-up 3 months  Compliance with losartan  Blood pressure check 3 months    Patient Instructions       Combined calcium and vitamin D supplementation appears to reduce fracture risk in older adults.  ?The optimal intake of calcium and vitamin D in postmenopausal women with osteoporosis, 1500 mg of calcium daily (total diet plus supplement) in divided doses and 4477-9164 international units of vitamin D daily are advised.        Foods and drinks with calcium     Food Calcium, milligrams   Milk (skim, 2 percent, or whole, 8 oz [240 mL]) 300   Yogurt (6 oz [168 g]) 250   Orange juice (with calcium, 8 oz [240 mL]) 300   Tofu with calcium (1/2 cup [113 g]) 435   Cheese (1 oz [28 g]) 195 to 335 (hard cheese = higher calcium)   Cottage cheese (1/2 cup [113 g]) 130   Ice cream or frozen yogurt (1/2 cup [113 g]) 100   Soy milk (8 oz [240 mL]) 300   Beans (1/2 cup cooked [113 g]) 60 to 80   Dark, leafy green vegetables (1/2 cup cooked [113 g]) 50 to 135   Almonds (24 whole) 70   Orange (1 medium) 60        Selected food sources of vitamin D   Food International units per serving   Cod liver oil, 1 tablespoon (15 mL) 1360   [place] (sockeye), cooked, 3 ounces (85 g) 794   Mushrooms that have been exposed to ultraviolet light to increase vitamin D, 3  ounces (85 g) (not yet commonly available) 400   Mackerel, cooked, 3 ounces (85 g) 388   Tuna fish, canned in water, drained, 3 ounces (85 g) 154   Milk, nonfat, reduced fat, and whole, vitamin D-fortified, 8 ounces (240 mL) 115 to 124   Orange juice fortified with vitamin D, 8 ounces (240 mL) (check product labels, as amount of added vitamin D varies) 100   Yogurt, fortified with 20 percent of the DV for vitamin D, 6 ounces (180 mL) (more heavily fortified yogurts provide more of the DV) 80   Margarine, fortified, 1 tablespoon (15 g) 60   Sardines, canned in oil, drained, 2 sardines 46   Liver, beef, cooked, 3.5 ounces (100 g) 46   Ready-to-eat cereal, fortified with 10 percent of the DV for vitamin D, 6 to 8 ounces (227 g) (more heavily fortified cereals might provide more of the DV) 40   Egg, 1 whole (vitamin D is found in yolk) 25   Cheese, Swiss, 1 ounce (29 g) 6     Elemental calcium and Vitamin D content per pill of different calcium supplements    Elemental Ca/tablet Ca compound Vitamin D   Caltrate 600 + D3 600 mg Carbonate 800 units   Caltrate 600 + D3 Soft Chews  600 mg  Carbonate  800 units    Caltrate Gummy Bites 250 mg  Tribasic calcium phosphate 400 units   Caltrate 600 + D3 Plus Minerals Chewables  600 mg Carbonate 800 units   Caltrate 600 + D3 Plus Minerals Minis 300 mg Carbonate 800 units   Citracal Petites  200 mg  Citrate 250 units   Citracal Maximum  315 mg Citrate 250 units   Citracal Plus Magnesium & Minerals 250 mg  Citrate 125 units   Citracal + D Slow Release  600 mg Citrate 500 units   Citracal Calcium Gummies 250 mg Tricalcium phosphate 500 units   Citracal Calcium Pearls  200 mg  Carbonate 500 units   OsCal Calcium + D3 500 mg Carbonate 200 units   Oscal Extra + D3 500 mg Carbonate 600 units   Oscal Ultra 600 mg Carbonate 500 units   Oscal Chewable  500  Carbonate 600 units   Tums 200 mg Carbonate -   Tums Extra Strength 300 mg Carbonate -   Tums Ultra Strength 400 mg Carbonate -   Tums  Chewy Delights  400 mg  Carbonate -   Viactiv Calcium plus D + K 500 mg Carbonate 500 units  (or 1000 units in sugar-free)   Units: international units.         website  [email or web address]                 Diagnoses and all orders for this visit:    Hypertension    Hyponatremia  -     Renal Function Profile    Hearing loss  -     Ambulatory referral to ENT    Osteopenia  -     Vitamin D, Total (25-Hydroxy)    Low vitamin D level  -     Vitamin D, Total (25-Hydroxy)    Other orders  -     cholecalciferol, vitamin D3, 5,000 unit capsule; Take 1 capsule (5,000 Units total) by mouth daily.; Refill: 0        Chris South MD  Internal medicine  [facility]  [phone]  [email or web address]    This is an electronically verified report by Chris South M.D.  (Note created with Dragon voice recognition and unintended spelling errors and word substitutions may occur)             Subjective:   Chief Complaint:  Results (Dexa)    Here for follow-up    Bone density shows significant loss--2.3  Vitamin D 5-incredibly low  Parathyroid hormone normal  Patient is alcoholic  No history of GI illness  No history of celiac-recent lab check negative    Not taking at bedtime losartan  Does not want to combine is in the past she has not felt good when she is combined with metoprolol.  We discussed decreasing her dose    Medications:  Current Outpatient Prescriptions on File Prior to Visit   Medication Sig     hydrOXYzine pamoate (VISTARIL) 25 MG capsule Take 25 mg by mouth 3 (three) times a day as needed.      losartan (COZAAR) 50 MG tablet Take 1 tablet (50 mg total) by mouth daily.     metoprolol succinate (TOPROL XL) 25 MG Take 1 tablet (25 mg total) by mouth daily.     [DISCONTINUED] cholecalciferol, vitamin D3, 1,000 unit capsule Take 1,000 Units by mouth daily.     No current facility-administered medications on file prior to visit.      Allergies:No Known Allergies    Review of Systems:     Extensive  "10-point review of systems was performed. Please see the HPI for problem specific pertinent review of systems.     Patient does note no diarrhea  Is enjoying the relationship she is establishing with a counselor in [place]    Otherwise, the following systems are noncontributory including constitutional, eyes, ears, nose and throat, cardiovascular, respiratory, gastrointestinal, genitourinary, musculoskeletal,neurological, skin and/or breast, endocrine, hematologic/lymph, allergic/immunologic and psychiatric.      Objective:    /88  Pulse 86  Ht 5' 2.75\" (1.594 m)  Wt 118 lb (53.5 kg)  LMP  (LMP Unknown)  SpO2 99%  Breastfeeding? No  BMI 21.07 kg/m2  Weight:   Wt Readings from Last 3 Encounters:   06/06/18 118 lb (53.5 kg)   04/05/18 115 lb (52.2 kg)   02/21/18 115 lb 0.6 oz (52.2 kg)     [unfilled]  118 lb (53.5 kg)    In general, no acute distress.  Vitals:    06/06/18 1500 06/06/18 1611   BP: 152/86 150/88   Patient Site: Right Arm    Patient Position: Sitting    Cuff Size: Adult Regular    Pulse: 86    SpO2: 99%    Weight: 118 lb (53.5 kg)    Height: 5' 2.75\" (1.594 m)      Pulses regular  Weight stable    Reviewed bone density--2.3 left femoral neck  Lumbar spine -1.9         Review of clinical lab tests  Lab Results   Component Value Date    WBC 6.0 02/21/2018    HGB 13.4 02/21/2018    HCT 40.2 02/21/2018     02/21/2018    CHOL 253 (H) 02/21/2018    ALT 34 02/21/2018    AST 48 (H) 02/21/2018     (L) 04/05/2018    K 4.5 04/05/2018    CL 93 (L) 04/05/2018    CREATININE 0.55 (L) 04/05/2018    BUN 4 (L) 04/05/2018    CO2 20 (L) 04/05/2018    TSH 2.17 02/21/2018    INR 0.98 09/05/2014     Lab Results   Component Value Date    BNP 23 04/05/2018     Vitamin D, Total (25-Hydroxy)   Date Value Ref Range Status   02/21/2018 5.4 (L) 30.0 - 80.0 ng/mL Final       Glucose   Date/Time Value Ref Range Status   04/05/2018 09:13 AM 78 70 - 125 mg/dL Final   02/21/2018 03:55 PM 77 70 - 125 " mg/dL Final   11/01/2014 02:45 PM 82 70 - 125 mg/dL Final   09/05/2014 09:51 AM 76 70 - 125 mg/dL Final     Comment:       Fasting Glucose reference range is 70-99 mg/dL per  American Diabetes Association (ADA) guidelines.   09/04/2014 10:06  70 - 125 mg/dL Final     Comment:       Fasting Glucose reference range is 70-99 mg/dL per  American Diabetes Association (ADA) guidelines.     No results found for this or any previous visit (from the past 24 hour(s)).    No results found.

## 2021-06-18 NOTE — PATIENT INSTRUCTIONS - HE
Patient Instructions by Aden Nunes MD at 8/31/2020  2:40 PM     Author: Aden Nunes MD Service: -- Author Type: Physician    Filed: 8/31/2020  3:28 PM Encounter Date: 8/31/2020 Status: Addendum    : Aden Nunes MD (Physician)    Related Notes: Original Note by Aden Nunes MD (Physician) filed at 8/31/2020  3:28 PM       I have started you on 64160 units of Vitamin D to take once weekly X 12 weeks. After, then start taking 2000 Units daily.   I want you to start 1 tab of the amlodipine daily (10 mg) to help with your blood pressure    Patient Education   Vitamin D  Does this test have other names?  25-hydroxyvitamin D (25-high-DROX-ee-VIE-tuh-min D), 25(OH)D  What is this test?  Vitamin D is mainly found in fortified dairy foods, juice, breakfast cereal, and certain fish. This vitamin plays many roles in the body. But because it helps the body absorb calcium from foods and supplements, it's particularly important for bone health. Vitamin D has many additional roles in the body.  Vitamin D comes in several forms. When ultraviolet light, such as sunlight, hits your skin, it creates vitamin D3. D2 is used to fortify dairy foods. Both of these are further processed by your liver and kidneys into a form your body can use. Most tests for vitamin D check the level of a form circulating in the body called 25-hydroxyvitamin D, also called 25(OH)D.   Why do I need this test?  You may need this test if your healthcare provider wants to check your vitamin D levels to find out if you have any risks to bone health. These might be:    Low calcium    Soft bones caused by low vitamin D or problems using it (osteomalacia)    Osteopenia    Osteoporosis    Rickets, in children  You may also need this test if you are at risk for low vitamin D levels. Risks include:    Being an older adult    Having difficulty absorbing fat from your diet    Having chronic kidney  disease    Have dark skin pigmentation    Being a  baby  Vitamin D has many effects in the body. You may need this test to help your healthcare provider diagnose or treat:    Problems with the parathyroid gland    Cancer    Autoimmune diseases, such as multiple sclerosis and Crohn's disease    Psoriasis    Asthma    Weakness or falls    What other tests might I have along with this test?  A healthcare provider may also want to check your parathyroid hormone levels and your calcium levels.   What do my test results mean?  Test results may vary depending on your age, gender, health history, the method used for the test, and other things. Your test results may not mean you have a problem. Ask your healthcare provider what your test results mean for you.   Children and adults need more than 30 nanograms per milliliter (ng/ml) of vitamin D. The optimal level of 25(OH)D is usually between 30 and 60 ng/mL. Recommended daily amounts range from 400 to 800 international units (IU) per day based on your age.  Levels lower than normal can mean you are:    Not making enough vitamin D on your own    Not getting enough vitamin D in your diet    Not absorbing vitamin D from your food as you should  Lower levels may also mean that your body is not converting the vitamin as it should. This might be because of kidney or liver disease.  Above-normal levels may be a sign that you're taking too much in supplement form.   How is this test done?  The test is done with a blood sample. A needle is used to draw blood from a vein in your arm or hand.   Does this test pose any risks?  Having a blood test with a needle carries some risks. These include bleeding, infection, bruising, and feeling lightheaded. When the needle pricks your arm or hand, you may feel a slight sting or pain. Afterward, the site may be sore.   What might affect my test results?  The amount of time you spend in the sunlight, your diet, and whether you take  vitamin D in supplement form can affect your vitamin D levels. Ask your healthcare provider if any health conditions you have or medicines you take could affect your results.

## 2021-06-21 NOTE — PROGRESS NOTES
Call    Ivon you have a bladder infection.  I am sending in an antibiotic for you.  Cipro 500 mg 1 twice a day for a week.  This would also cover a bowel infection.

## 2021-06-21 NOTE — LETTER
Letter by Aden Nunes MD at      Author: Aden Nunes MD Service: -- Author Type: --    Filed:  Encounter Date: 11/16/2020 Status: (Other)         Ivon Sheets  [address] [place] [address]   November 16, 2020     Dear MsAlonso Sheets,    Below are the results from your recent visit:    Resulted Orders   Basic Metabolic Panel   Result Value Ref Range    Sodium 129 (L) 136 - 145 mmol/L    Potassium 4.7 3.5 - 5.0 mmol/L    Chloride 91 (L) 98 - 107 mmol/L    CO2 23 22 - 31 mmol/L    Anion Gap, Calculation 15 5 - 18 mmol/L    Glucose 78 70 - 125 mg/dL    Calcium 9.6 8.5 - 10.5 mg/dL    BUN 4 (L) 8 - 22 mg/dL    Creatinine 0.53 (L) 0.60 - 1.10 mg/dL    GFR MDRD Af Amer >60 >60 mL/min/1.73m2    GFR MDRD Non Af Amer >60 >60 mL/min/1.73m2    Narrative    Fasting Glucose reference range is 70-99 mg/dL per  American Diabetes Association (ADA) guidelines.   Magnesium   Result Value Ref Range    Magnesium 2.1 1.8 - 2.6 mg/dL   Vitamin D, Total (25-Hydroxy)   Result Value Ref Range    Vitamin D, Total (25-Hydroxy) 80.8 (H) 30.0 - 80.0 ng/mL    Narrative    Deficiency <10.0 ng/mL  Insufficiency 10.0-29.9 ng/mL  Sufficiency 30.0-80.0 ng/mL  Toxicity (possible) >100.0 ng/mL     Your vitamin D looks good and your magnesium level normal. Your sodium level is a bit low. Have you ever taken sodium tablets? Do you have any thoughts as to why your sodium is lower over the last 2 months?    Please call with questions or contact us using [employer].    Sincerely,    Electronically signed by Aden Nunes MD

## 2021-06-21 NOTE — LETTER
Letter by Aden Nunes MD at      Author: Aden Nunes MD Service: -- Author Type: --    Filed:  Encounter Date: 10/16/2020 Status: (Other)         10/16/20      Ivon Sheets  [address] [place] [address]    Dear Ivon,    As a valued [facility] patient, your healthcare needs are our priority. Our clinic records indicate we have attempted to contact you to reschedule your appt on October 30th at 2:40 pm with Dr. Nunes, he will be out of the office that day, your voice mail is full. To prevent further delays in your care please contact our office to re- schedule your appointment as soon as possible.      Sincerely,    Venkatesh Damico

## 2021-06-21 NOTE — PROGRESS NOTES
Inflamed intestines can be due to infection.  If this persists she will need a colonoscopy to exclude ulcerative colitis or Crohn's disease.  Complete the antibiotic.  I believe we have an appointment in a few weeks.  We will discuss more at that time.  If you are not getting better by Monday Tuesday let us know and I will have you see the gastroenterologist (Routing comment)

## 2021-06-21 NOTE — PROGRESS NOTES
OFFICE VISIT NOTE  Ivon Sheets   48 y.o. female            Assessment/Plan for  Ivon Sheets is a 48 y.o. female.  No Patient Care Coordination Note on file.       1. Abdominal pain, generalized  With some urinary symptoms.  Consider UTI, diverticulitis, urinary returns positive.  Rx Cipro        2. Hypertension  Controlled.  No hyponatremia       Plan:  CT scan  UA  Lab  UA back-pyuria with bacteria-Rx Cipro-call    There are no Patient Instructions on file for this visit.    Diagnoses and all orders for this visit:    Abdominal pain, generalized  -     HM1(CBC and Differential)  -     Hepatic Profile  -     Renal Function Profile  -     Urinalysis-UC if Indicated  -     Lipase  -     HM1 (CBC with Diff)  -     Culture, Urine    Hypertension    Lower abdominal pain  -     CT Abdomen Pelvis With Oral With IV Contrast; Future; Expected date: 10/29/18    Migraine headache  -     Ambulatory referral to Neurology    Other orders  -     ciprofloxacin HCl (CIPRO) 500 MG tablet; Take 1 tablet (500 mg total) by mouth 2 (two) times a day for 7 days.  Dispense: 14 tablet; Refill: 0        Medications after visit  Current Outpatient Prescriptions   Medication Sig Dispense Refill     cholecalciferol, vitamin D3, 5,000 unit capsule Take 1 capsule (5,000 Units total) by mouth daily.  0     hydrOXYzine pamoate (VISTARIL) 25 MG capsule Take 25 mg by mouth 3 (three) times a day as needed.        losartan (COZAAR) 50 MG tablet Take 1 tablet (50 mg total) by mouth daily. 30 tablet 11     metoprolol succinate (TOPROL XL) 25 MG Take 1 tablet (25 mg total) by mouth daily. 30 tablet 11     ciprofloxacin HCl (CIPRO) 500 MG tablet Take 1 tablet (500 mg total) by mouth 2 (two) times a day for 7 days. 14 tablet 0     No current facility-administered medications for this visit.                       Chris South MD  Internal medicine  [facility]  [phone]  [email or web address]    Much or  all of the text in this note was generated through the use of Dragon Dictate voice-to-text software. Errors in spelling or words which seem out of context are unintentional.   Sound alike errors, in particular, may have escaped editing.                 Subjective:   Chief Complaint:  Abdominal Cramping; Hypertension; Follow-up; Migraine; and Back Pain (Low back)    New issue  8 days of not feeling well  [place] for her 99 6  A bit achy  Bilateral nondescript back pain  Bilateral lower quadrant abdominal pain  's nausea x1 without emesis  Is stooling but remains constipated  No melena hematochezia  Feels bloated    Postmenopausal no bleeding    She feels like she may have bladder infection.  Has a little bit of bloating.  No carlos dysuria  No discolored urine    She has cut down on her smoking  She does drink  She drinks a lot of caffeine-diet drinks    No prior abdominal surgery    Review of Systems:     Extensive 10-point review of systems was performed. Please see the HPI for problem specific pertinent review of systems.     Patient does note she has been working and is not missed work up until last week    Otherwise, the following systems are noncontributory including constitutional, eyes, ears, nose and throat, cardiovascular, respiratory, gastrointestinal, genitourinary, musculoskeletal,neurological, skin and/or breast, endocrine, hematologic/lymph, allergic/immunologic and psychiatric.              Medications:  Current Outpatient Prescriptions on File Prior to Visit   Medication Sig     cholecalciferol, vitamin D3, 5,000 unit capsule Take 1 capsule (5,000 Units total) by mouth daily.     hydrOXYzine pamoate (VISTARIL) 25 MG capsule Take 25 mg by mouth 3 (three) times a day as needed.      losartan (COZAAR) 50 MG tablet Take 1 tablet (50 mg total) by mouth daily.     metoprolol succinate (TOPROL XL) 25 MG Take 1 tablet (25 mg total) by mouth daily.     No current facility-administered medications on file  "prior to visit.             Allergies:No Known Allergies    PSFHx: Tobacco Status:  She  reports that she has been smoking.  She has smoked for the past 26.00 years. She has never used smokeless tobacco.   Alcohol Status:    History   Alcohol Use     Yes     Comment: 3-4 beers/day       reports that she has been smoking.  She has smoked for the past 26.00 years. She has never used smokeless tobacco. She reports that she drinks alcohol. She reports that she uses illicit drugs.    Objective:    /82  Pulse (!) 112  Temp 97.9  F (36.6  C)  Ht 5' 2.75\" (1.594 m)  Wt 119 lb (54 kg)  LMP  (LMP Unknown)  SpO2 99%  Breastfeeding? No  BMI 21.25 kg/m2  Weight:   Wt Readings from Last 3 Encounters:   10/29/18 119 lb (54 kg)   06/06/18 118 lb (53.5 kg)   04/05/18 115 lb (52.2 kg)     BP Readings from Last 10 Encounters:   10/29/18 148/82   06/06/18 150/88   04/05/18 148/68   02/21/18 (!) 196/102   11/01/14 (!) 167/94   09/05/14 110/70     Vitals:    10/29/18 1430 10/29/18 1626   BP: 162/80 148/82   Patient Site: Left Arm    Patient Position: Sitting    Cuff Size: Adult Regular    Pulse: (!) 112    Temp: 97.9  F (36.6  C)    SpO2: 99%    Weight: 119 lb (54 kg)    Height: 5' 2.75\" (1.594 m)        Blood pressure confirmed  General-appears well, no acute distress.  Patient appears tired.  She appears older than her stated age  She is afebrile  Her pulse is 80    No rash  Face symmetric  No neck adenopathy  Lungs totally clear  No pleurisy  Cardiac without murmur    Abdomen normal shape  Good bowel sounds  Some tenderness left and right lower quadrants  No rebound  No palpable mass  Organomegaly  No inguinal adenopathy    No CVA pain  No direct lumbar spine pain  No edema        Review of clinical lab tests  Lab Results   Component Value Date    WBC 6.0 02/21/2018    HGB 13.4 02/21/2018    HCT 40.2 02/21/2018     02/21/2018    CHOL 253 (H) 02/21/2018    ALT 34 02/21/2018    AST 48 (H) 02/21/2018     (L) " 06/06/2018    K 4.4 06/06/2018    CL 95 (L) 06/06/2018    CREATININE 0.54 (L) 06/06/2018    BUN 6 (L) 06/06/2018    CO2 23 06/06/2018    TSH 2.17 02/21/2018    INR 0.98 09/05/2014       Glucose   Date/Time Value Ref Range Status   06/06/2018 03:37 PM 75 70 - 125 mg/dL Final   04/05/2018 09:13 AM 78 70 - 125 mg/dL Final   02/21/2018 03:55 PM 77 70 - 125 mg/dL Final   11/01/2014 02:45 PM 82 70 - 125 mg/dL Final   09/05/2014 09:51 AM 76 70 - 125 mg/dL Final     Comment:       Fasting Glucose reference range is 70-99 mg/dL per  American Diabetes Association (ADA) guidelines.   09/04/2014 10:06  70 - 125 mg/dL Final     Comment:       Fasting Glucose reference range is 70-99 mg/dL per  American Diabetes Association (ADA) guidelines.     Recent Results (from the past 24 hour(s))   Urinalysis-UC if Indicated   Result Value Ref Range    Color, UA Yellow Colorless, Yellow, Straw, Light Yellow    Clarity, UA Clear Clear    Glucose, UA Negative Negative    Bilirubin, UA Negative Negative    Ketones, UA Negative Negative    Specific Gravity, UA <=1.005 1.005 - 1.030    Blood, UA Trace (!) Negative    pH, UA 6.0 5.0 - 8.0    Protein, UA Negative Negative mg/dL    Urobilinogen, UA 0.2 E.U./dL 0.2 E.U./dL, 1.0 E.U./dL    Nitrite, UA Negative Negative    Leukocytes, UA Moderate (!) Negative    Bacteria, UA Few (!) None Seen hpf    RBC, UA 0-2 None Seen, 0-2 hpf    WBC, UA 10-25 (!) None Seen, 0-5 hpf    Squam Epithel, UA 0-5 None Seen, 0-5 lpf       RADIOLOGY: No results found.    Review of recent consultation-

## 2021-06-24 NOTE — TELEPHONE ENCOUNTER
Refill Approved    Rx renewed per Medication Renewal Policy. Medication was last renewed on 2/21/18.    Zulay Tucker, [facility] Triage/Med Refill 2/25/2019     Requested Prescriptions   Pending Prescriptions Disp Refills     metoprolol succinate (TOPROL-XL) 25 MG [Pharmacy Med Name: METOPROLOL ER SUCCINATE 25MG TABS] 30 tablet 0     Sig: TAKE 1 TABLET(25 MG) BY MOUTH DAILY    Beta-Blockers Refill Protocol Passed - 2/24/2019  1:47 PM       Passed - PCP or prescribing provider visit in past 12 months or next 3 months    Last office visit with prescriber/PCP: 10/29/2018 Chris South MD OR same dept: 10/29/2018 Chris South MD OR same specialty: 10/29/2018 Chris South MD  Last physical: Visit date not found Last MTM visit: Visit date not found   Next visit within 3 mo: Visit date not found  Next physical within 3 mo: Visit date not found  Prescriber OR PCP: Chris South MD  Last diagnosis associated with med order: 1. Hypertension  - metoprolol succinate (TOPROL-XL) 25 MG [Pharmacy Med Name: METOPROLOL ER SUCCINATE 25MG TABS]; TAKE 1 TABLET(25 MG) BY MOUTH DAILY  Dispense: 30 tablet; Refill: 0    If protocol passes may refill for 12 months if within 3 months of last provider visit (or a total of 15 months).            Passed - Blood pressure filed in past 12 months    BP Readings from Last 1 Encounters:   10/29/18 148/82

## 2021-09-07 ENCOUNTER — TELEPHONE (OUTPATIENT)
Dept: FAMILY MEDICINE | Facility: CLINIC | Age: 51
End: 2021-09-07

## 2021-09-07 ENCOUNTER — HOSPITAL ENCOUNTER (INPATIENT)
Facility: CLINIC | Age: 51
LOS: 1 days | Discharge: LEFT BEFORE DISCHARGE | DRG: 894 | End: 2021-09-09
Attending: EMERGENCY MEDICINE | Admitting: INTERNAL MEDICINE
Payer: COMMERCIAL

## 2021-09-07 ENCOUNTER — APPOINTMENT (OUTPATIENT)
Dept: CT IMAGING | Facility: CLINIC | Age: 51
DRG: 894 | End: 2021-09-07
Attending: EMERGENCY MEDICINE
Payer: COMMERCIAL

## 2021-09-07 DIAGNOSIS — G40.909 NONINTRACTABLE EPILEPSY WITHOUT STATUS EPILEPTICUS, UNSPECIFIED EPILEPSY TYPE (H): ICD-10-CM

## 2021-09-07 DIAGNOSIS — F10.20 ALCOHOLISM (H): ICD-10-CM

## 2021-09-07 DIAGNOSIS — E87.1 HYPONATREMIA: ICD-10-CM

## 2021-09-07 LAB
ALBUMIN SERPL-MCNC: 4 G/DL (ref 3.4–5)
ALP SERPL-CCNC: 93 U/L (ref 40–150)
ALT SERPL W P-5'-P-CCNC: 60 U/L (ref 0–50)
ANION GAP SERPL CALCULATED.3IONS-SCNC: 14 MMOL/L (ref 3–14)
AST SERPL W P-5'-P-CCNC: 93 U/L (ref 0–45)
BILIRUB DIRECT SERPL-MCNC: 0.3 MG/DL (ref 0–0.2)
BILIRUB SERPL-MCNC: 0.8 MG/DL (ref 0.2–1.3)
BUN SERPL-MCNC: 3 MG/DL (ref 7–30)
CALCIUM SERPL-MCNC: 9.2 MG/DL (ref 8.5–10.1)
CHLORIDE BLD-SCNC: 87 MMOL/L (ref 94–109)
CO2 SERPL-SCNC: 19 MMOL/L (ref 20–32)
CREAT SERPL-MCNC: 0.42 MG/DL (ref 0.52–1.04)
ERYTHROCYTE [DISTWIDTH] IN BLOOD BY AUTOMATED COUNT: 12.3 % (ref 10–15)
ETHANOL SERPL-MCNC: <0.01 G/DL
GFR SERPL CREATININE-BSD FRML MDRD: >90 ML/MIN/1.73M2
GLUCOSE BLD-MCNC: 101 MG/DL (ref 70–99)
HCT VFR BLD AUTO: 34.1 % (ref 35–47)
HGB BLD-MCNC: 12.2 G/DL (ref 11.7–15.7)
HOLD SPECIMEN: NORMAL
MCH RBC QN AUTO: 33.5 PG (ref 26.5–33)
MCHC RBC AUTO-ENTMCNC: 35.8 G/DL (ref 31.5–36.5)
MCV RBC AUTO: 94 FL (ref 78–100)
PLATELET # BLD AUTO: 107 10E3/UL (ref 150–450)
POTASSIUM BLD-SCNC: 3.1 MMOL/L (ref 3.4–5.3)
PROT SERPL-MCNC: 7.4 G/DL (ref 6.8–8.8)
RBC # BLD AUTO: 3.64 10E6/UL (ref 3.8–5.2)
SARS-COV-2 RNA RESP QL NAA+PROBE: NEGATIVE
SODIUM SERPL-SCNC: 120 MMOL/L (ref 133–144)
WBC # BLD AUTO: 8.6 10E3/UL (ref 4–11)

## 2021-09-07 PROCEDURE — 99285 EMERGENCY DEPT VISIT HI MDM: CPT | Mod: 25

## 2021-09-07 PROCEDURE — C9803 HOPD COVID-19 SPEC COLLECT: HCPCS

## 2021-09-07 PROCEDURE — 82077 ASSAY SPEC XCP UR&BREATH IA: CPT | Performed by: EMERGENCY MEDICINE

## 2021-09-07 PROCEDURE — 83735 ASSAY OF MAGNESIUM: CPT | Performed by: INTERNAL MEDICINE

## 2021-09-07 PROCEDURE — G0378 HOSPITAL OBSERVATION PER HR: HCPCS

## 2021-09-07 PROCEDURE — 250N000011 HC RX IP 250 OP 636: Performed by: EMERGENCY MEDICINE

## 2021-09-07 PROCEDURE — 87635 SARS-COV-2 COVID-19 AMP PRB: CPT | Performed by: EMERGENCY MEDICINE

## 2021-09-07 PROCEDURE — 84100 ASSAY OF PHOSPHORUS: CPT | Performed by: INTERNAL MEDICINE

## 2021-09-07 PROCEDURE — 85027 COMPLETE CBC AUTOMATED: CPT | Performed by: EMERGENCY MEDICINE

## 2021-09-07 PROCEDURE — 96365 THER/PROPH/DIAG IV INF INIT: CPT

## 2021-09-07 PROCEDURE — 82040 ASSAY OF SERUM ALBUMIN: CPT | Performed by: EMERGENCY MEDICINE

## 2021-09-07 PROCEDURE — 36415 COLL VENOUS BLD VENIPUNCTURE: CPT | Performed by: EMERGENCY MEDICINE

## 2021-09-07 PROCEDURE — HZ2ZZZZ DETOXIFICATION SERVICES FOR SUBSTANCE ABUSE TREATMENT: ICD-10-PCS | Performed by: INTERNAL MEDICINE

## 2021-09-07 PROCEDURE — 70450 CT HEAD/BRAIN W/O DYE: CPT

## 2021-09-07 PROCEDURE — 80177 DRUG SCRN QUAN LEVETIRACETAM: CPT | Performed by: EMERGENCY MEDICINE

## 2021-09-07 PROCEDURE — 99220 PR INITIAL OBSERVATION CARE,LEVEL III: CPT | Performed by: INTERNAL MEDICINE

## 2021-09-07 RX ORDER — DIAZEPAM 10 MG/2ML
5-10 INJECTION, SOLUTION INTRAMUSCULAR; INTRAVENOUS EVERY 30 MIN PRN
Status: DISCONTINUED | OUTPATIENT
Start: 2021-09-07 | End: 2021-09-09 | Stop reason: HOSPADM

## 2021-09-07 RX ORDER — IBUPROFEN 600 MG/1
600 TABLET, FILM COATED ORAL EVERY 6 HOURS PRN
Status: DISCONTINUED | OUTPATIENT
Start: 2021-09-07 | End: 2021-09-09 | Stop reason: HOSPADM

## 2021-09-07 RX ORDER — OLANZAPINE 5 MG/1
5-10 TABLET, ORALLY DISINTEGRATING ORAL EVERY 6 HOURS PRN
Status: DISCONTINUED | OUTPATIENT
Start: 2021-09-07 | End: 2021-09-09 | Stop reason: HOSPADM

## 2021-09-07 RX ORDER — MAGNESIUM OXIDE 400 MG/1
400 TABLET ORAL DAILY
Status: DISCONTINUED | OUTPATIENT
Start: 2021-09-08 | End: 2021-09-09 | Stop reason: HOSPADM

## 2021-09-07 RX ORDER — LANOLIN ALCOHOL/MO/W.PET/CERES
100 CREAM (GRAM) TOPICAL DAILY
Status: DISCONTINUED | OUTPATIENT
Start: 2021-09-08 | End: 2021-09-09 | Stop reason: HOSPADM

## 2021-09-07 RX ORDER — FLUMAZENIL 0.1 MG/ML
0.2 INJECTION, SOLUTION INTRAVENOUS
Status: DISCONTINUED | OUTPATIENT
Start: 2021-09-07 | End: 2021-09-09 | Stop reason: HOSPADM

## 2021-09-07 RX ORDER — LEVETIRACETAM 500 MG/1
500 TABLET ORAL 2 TIMES DAILY
Status: DISCONTINUED | OUTPATIENT
Start: 2021-09-08 | End: 2021-09-09 | Stop reason: HOSPADM

## 2021-09-07 RX ORDER — DIAZEPAM 5 MG
10 TABLET ORAL EVERY 30 MIN PRN
Status: DISCONTINUED | OUTPATIENT
Start: 2021-09-07 | End: 2021-09-09 | Stop reason: HOSPADM

## 2021-09-07 RX ORDER — POTASSIUM CHLORIDE 1.5 G/1.58G
40 POWDER, FOR SOLUTION ORAL ONCE
Status: COMPLETED | OUTPATIENT
Start: 2021-09-07 | End: 2021-09-08

## 2021-09-07 RX ORDER — LEVETIRACETAM 10 MG/ML
1000 INJECTION INTRAVASCULAR ONCE
Status: COMPLETED | OUTPATIENT
Start: 2021-09-07 | End: 2021-09-07

## 2021-09-07 RX ORDER — FOLIC ACID 1 MG/1
1 TABLET ORAL DAILY
Status: DISCONTINUED | OUTPATIENT
Start: 2021-09-08 | End: 2021-09-09 | Stop reason: HOSPADM

## 2021-09-07 RX ORDER — CHOLECALCIFEROL (VITAMIN D3) 50 MCG
1 TABLET ORAL DAILY
COMMUNITY
Start: 2020-12-17

## 2021-09-07 RX ORDER — CLONIDINE HYDROCHLORIDE 0.1 MG/1
0.1 TABLET ORAL EVERY 8 HOURS
Status: DISCONTINUED | OUTPATIENT
Start: 2021-09-08 | End: 2021-09-09 | Stop reason: HOSPADM

## 2021-09-07 RX ORDER — MULTIPLE VITAMINS W/ MINERALS TAB 9MG-400MCG
1 TAB ORAL DAILY
Status: DISCONTINUED | OUTPATIENT
Start: 2021-09-08 | End: 2021-09-09 | Stop reason: HOSPADM

## 2021-09-07 RX ORDER — HALOPERIDOL 5 MG/ML
2.5-5 INJECTION INTRAMUSCULAR EVERY 6 HOURS PRN
Status: DISCONTINUED | OUTPATIENT
Start: 2021-09-07 | End: 2021-09-09 | Stop reason: HOSPADM

## 2021-09-07 RX ORDER — SODIUM CHLORIDE 9 MG/ML
INJECTION, SOLUTION INTRAVENOUS ONCE
Status: DISCONTINUED | OUTPATIENT
Start: 2021-09-07 | End: 2021-09-08

## 2021-09-07 RX ADMIN — LEVETIRACETAM 1000 MG: 10 INJECTION INTRAVENOUS at 19:29

## 2021-09-07 ASSESSMENT — ENCOUNTER SYMPTOMS
SEIZURES: 1
HEADACHES: 0
NECK PAIN: 0
BACK PAIN: 0

## 2021-09-07 ASSESSMENT — MIFFLIN-ST. JEOR: SCORE: 1037.73

## 2021-09-07 NOTE — TELEPHONE ENCOUNTER
9/9/21 establish care appointment scheduled with Dr Rubin. Will send to her for review.     Pt notified that refills will be completed during appt. If she is out of medication, she should contact her previous provider for short term supply.       Last visit with Dr Nunes 11/10/20.Will send to Dr Nunes's team.

## 2021-09-07 NOTE — ED PROVIDER NOTES
History   Chief Complaint:  Seizure      HPI The history is somewhat limited due to the patient's altered mental status.      Ivon Sheets is a 51 year old female with a history of seizures and alcohol abuse who presents via EMS for evaluation following a seizure. Today shortly prior to arrival the patient reportedly had a two minute seizure witnessed by her roommate, and EMS was called to bring her into the ED for evaluation. There was no report of her falling of otherwise injuring herself during this episode. The patient herself does not remember having a seizure today. Here in the ED the patient reports that she is feeling fine and denies any headache, neck pain or back pain. She reports that she has been taking her Keppra as prescribed, has not missed any doses recently, and last took her Keppra yesterday. Report was made by the paramedics that she has been running low on her medications and has been rationing her Keppra recently, although the patient disputes this. She also reports that she generally drinks a couple of 12 ounce beers a day and reports that she last drank yesterday and has not had a significant change in her alcohol consumption recently. She notes that three nights ago she did have several episodes of vomiting.     Review of Systems   Musculoskeletal: Negative for back pain and neck pain.   Neurological: Positive for seizures. Negative for headaches.   All other systems reviewed and are negative.      Allergies:  No known drug allergies      Medications:  Amlodipine   Norco  Keppra   Magnesium oxide  Toprol-XL   Omeprazole  Thiamine     Past Medical History:    Alcohol dependence  Depression  Polysubstance abuse   Takotsubo cardiomyopathy   Seizures   Intracranial hemorrhage     Past Surgical History:    Eye surgery      Family History:    CAD - Father   Alcohol abuse - Brother     Social History:  The patient presents to the ED via EMS.   Alcohol use: Positive     Physical Exam  "    Patient Vitals for the past 24 hrs:   BP Temp Temp src Pulse Resp SpO2 Height Weight   09/07/21 2100 112/80 -- -- 96 -- 97 % -- --   09/07/21 2030 (!) 123/92 -- -- 94 -- 93 % -- --   09/07/21 1940 -- -- -- -- -- 96 % -- --   09/07/21 1930 133/83 -- -- 80 -- -- -- --   09/07/21 1847 (!) 146/95 98.5  F (36.9  C) Oral 89 18 -- 1.6 m (5' 3\") 45.4 kg (100 lb)       Physical Exam  Eye:  Pupils are equal, round, and reactive.  Extraocular movements intact.    ENT:  No rhinorrhea.  Moist mucus membranes.  Normal tongue and tonsil.    Cardiac:  Regular rate and rhythm.  No murmurs, gallops, or rubs.    Pulmonary:  Clear to auscultation bilaterally.  No wheezes, rales, or rhonchi.    Abdomen:  Positive bowel sounds.  Abdomen is soft and non-distended, without focal tenderness.    Musculoskeletal:  Normal movement of all extremities without evidence for deficit. No evidence of head trauma. No midline tenderness to neck or spine.     Skin:  Warm and dry without rashes.    CN II - XII intact.  5/5 strength in all extremities.  Normal sensation throughout.  Normal finger to nose and heel to shin.  2+ patellar reflexes.  Normal gait.    Psychiatric:  Normal affect with appropriate interaction with examiner.    Emergency Department Course     Imaging:  Head CT w/o Contrast:  IMPRESSION:   1.  No acute intracranial process.   2.  Advanced atrophy for age.  Per radiology.     Laboratory:   CBC: WBC 8.6, HGB 12.2,  low   BMP: Sodium 120 low, Potassium 3.1 low, Chloride 87 low, Carbon dioxide 19 low, Urea nitrogen 3 low, Creatinine 0.42 low, Glucose 101 high, o/w WNL   Hepatic panel: Bilirubin direct 0.3 high, AST 93 high, ALT 60 high, o/w WNL   Alcohol ethyl: <0.01   Keppra level: Pending   Asymptomatic COVID19 Virus PCR by nasopharyngeal swab: Negative   UA with Microscopic reflex to Culture: Pending      Emergency Department Course:  Reviewed:  I reviewed nursing notes, vitals and past medical " history    Assessments:  1850: I obtained history and examined the patient as noted above.     2038: I updated and reassessed the patient.     Consults:   2245: I spoke with Dr. Grijalva of the hospitalist service regarding patient's presentation, findings, and plan of care.     Interventions:  1929 Keppra 1,000 mg IV     Disposition:  The patient was admitted to the hospital under the care of Dr. Grijalva.       Impression & Plan     Medical Decision Making:  This 51-year-old alcoholic (beer drinker) presents to us with a seizure.  She had one prior seizure last year where she struck her head and suffered an intracranial hemorrhage.  Seizures were thought to be multifactorial, related to alcohol withdrawal, head trauma, hyponatremia.  She has been on Keppra for the past year but does continue to drink regularly.  She felt ill over the weekend with 1 day of vomiting though she has been tolerating water since.  She notes she continues to drink 2-3 beers per day and has been compliant with her Keppra.  However, she suffered a 1 to 2-minute seizure that was a [place] seizure witnessed by her roommate and EMS was contacted who brought her to us.    She was initially postictal but then did clear appropriately.  Her family was at the bedside and they feel that she is at her baseline.  She did not suffer any trauma.  Head to toe exam is reassuring with no evidence of neurologic deficit.  While her baseline sodium is chronically low in the high 120s, she is 120 today and I am worried that this may have led to her seizure.  There is some question as to whether she has been compliant with her Keppra and a level is pending but I chose to give her 1 g IV to load her.  The remainder of her labs are reassuring.  I believe she requires admission to the hospital for observation while we replete her sodium and verify she has no further seizures.  I spoke with Dr. Grijalva of the hospitalist service who agrees to accept care of the  patient.  She requested a head CT which fortunately is negative.  Otherwise, we initiated normal saline at 75 mL/h we will trend her sodium with neurologic consultation and probable discharge in the next 24 to 48 hours assuming no further seizures occur.      Diagnosis:    ICD-10-CM    1. Nonintractable epilepsy without status epilepticus, unspecified epilepsy type (H)  G40.909    2. Hyponatremia  E87.1    3. Alcoholism (H)  F10.20         Scribe Disclosure:  I, Blayne Day, am serving as a scribe at 6:46 PM on 9/7/2021 to document services personally performed by Trierweiler, Chad A, MD based on my observations and the provider's statements to me.         Trierweiler, Chad A, MD  09/08/21 0617

## 2021-09-07 NOTE — TELEPHONE ENCOUNTER
Reason for Call:  Medication or medication refill:    Do you use a [facility]?  Name of the pharmacy and phone number for the current request:  [email or web address]    Name of the medication requested: vit d 3 2000 I.u.  Amlodipine and magnesium oxide    Other request: is out of these- does have an appt on the 9th.  But does Kepra    Can we leave a detailed message on this number? YES    Phone number patient can be reached at: Work number on file: cell [phone]  Best Time:     Call taken on 9/7/2021 at 1:48 PM by Tayla Rosales

## 2021-09-08 ENCOUNTER — HOSPITAL ENCOUNTER (OUTPATIENT)
Dept: NEUROLOGY | Facility: CLINIC | Age: 51
Setting detail: OBSERVATION
DRG: 894 | End: 2021-09-08
Attending: INTERNAL MEDICINE
Payer: COMMERCIAL

## 2021-09-08 ENCOUNTER — APPOINTMENT (OUTPATIENT)
Dept: PHYSICAL THERAPY | Facility: CLINIC | Age: 51
DRG: 894 | End: 2021-09-08
Attending: INTERNAL MEDICINE
Payer: COMMERCIAL

## 2021-09-08 ENCOUNTER — APPOINTMENT (OUTPATIENT)
Dept: OCCUPATIONAL THERAPY | Facility: CLINIC | Age: 51
DRG: 894 | End: 2021-09-08
Attending: INTERNAL MEDICINE
Payer: COMMERCIAL

## 2021-09-08 LAB
ALBUMIN UR-MCNC: 20 MG/DL
APPEARANCE UR: ABNORMAL
BACTERIA #/AREA URNS HPF: ABNORMAL /HPF
BILIRUB UR QL STRIP: NEGATIVE
COLOR UR AUTO: ABNORMAL
GLUCOSE UR STRIP-MCNC: NEGATIVE MG/DL
HGB UR QL STRIP: NEGATIVE
HOLD SPECIMEN: NORMAL
HOLD SPECIMEN: NORMAL
KETONES UR STRIP-MCNC: 40 MG/DL
LEUKOCYTE ESTERASE UR QL STRIP: ABNORMAL
MAGNESIUM SERPL-MCNC: 1.7 MG/DL (ref 1.6–2.3)
MUCOUS THREADS #/AREA URNS LPF: PRESENT /LPF
NITRATE UR QL: POSITIVE
OSMOLALITY SERPL: 250 MMOL/KG (ref 275–295)
OSMOLALITY UR: 260 MMOL/KG (ref 100–1200)
PH UR STRIP: 6.5 [PH] (ref 5–7)
PHOSPHATE SERPL-MCNC: 2.3 MG/DL (ref 2.5–4.5)
POTASSIUM BLD-SCNC: 3.1 MMOL/L (ref 3.4–5.3)
POTASSIUM BLD-SCNC: 3.4 MMOL/L (ref 3.4–5.3)
POTASSIUM BLD-SCNC: 3.4 MMOL/L (ref 3.4–5.3)
RBC URINE: 3 /HPF
SODIUM SERPL-SCNC: 121 MMOL/L (ref 133–144)
SODIUM SERPL-SCNC: 122 MMOL/L (ref 133–144)
SODIUM SERPL-SCNC: 123 MMOL/L (ref 133–144)
SODIUM SERPL-SCNC: 124 MMOL/L (ref 133–144)
SODIUM SERPL-SCNC: 126 MMOL/L (ref 133–144)
SODIUM UR-SCNC: 28 MMOL/L
SP GR UR STRIP: 1.01 (ref 1–1.03)
SQUAMOUS EPITHELIAL: <1 /HPF
UROBILINOGEN UR STRIP-MCNC: NORMAL MG/DL
WBC URINE: 4 /HPF

## 2021-09-08 PROCEDURE — 95816 EEG AWAKE AND DROWSY: CPT

## 2021-09-08 PROCEDURE — 80177 DRUG SCRN QUAN LEVETIRACETAM: CPT | Performed by: PSYCHIATRY & NEUROLOGY

## 2021-09-08 PROCEDURE — 81001 URINALYSIS AUTO W/SCOPE: CPT | Performed by: EMERGENCY MEDICINE

## 2021-09-08 PROCEDURE — 97535 SELF CARE MNGMENT TRAINING: CPT | Mod: GO | Performed by: OCCUPATIONAL THERAPIST

## 2021-09-08 PROCEDURE — 83930 ASSAY OF BLOOD OSMOLALITY: CPT | Performed by: INTERNAL MEDICINE

## 2021-09-08 PROCEDURE — 36415 COLL VENOUS BLD VENIPUNCTURE: CPT | Performed by: INTERNAL MEDICINE

## 2021-09-08 PROCEDURE — 96361 HYDRATE IV INFUSION ADD-ON: CPT

## 2021-09-08 PROCEDURE — 97116 GAIT TRAINING THERAPY: CPT | Mod: GP

## 2021-09-08 PROCEDURE — 84300 ASSAY OF URINE SODIUM: CPT | Performed by: INTERNAL MEDICINE

## 2021-09-08 PROCEDURE — G0378 HOSPITAL OBSERVATION PER HR: HCPCS

## 2021-09-08 PROCEDURE — 84295 ASSAY OF SERUM SODIUM: CPT | Performed by: INTERNAL MEDICINE

## 2021-09-08 PROCEDURE — 258N000003 HC RX IP 258 OP 636: Performed by: INTERNAL MEDICINE

## 2021-09-08 PROCEDURE — 97165 OT EVAL LOW COMPLEX 30 MIN: CPT | Mod: GO | Performed by: OCCUPATIONAL THERAPIST

## 2021-09-08 PROCEDURE — 87086 URINE CULTURE/COLONY COUNT: CPT | Performed by: EMERGENCY MEDICINE

## 2021-09-08 PROCEDURE — 97161 PT EVAL LOW COMPLEX 20 MIN: CPT | Mod: GP

## 2021-09-08 PROCEDURE — 99233 SBSQ HOSP IP/OBS HIGH 50: CPT | Performed by: INTERNAL MEDICINE

## 2021-09-08 PROCEDURE — 83935 ASSAY OF URINE OSMOLALITY: CPT | Performed by: INTERNAL MEDICINE

## 2021-09-08 PROCEDURE — 250N000013 HC RX MED GY IP 250 OP 250 PS 637: Performed by: INTERNAL MEDICINE

## 2021-09-08 PROCEDURE — 84132 ASSAY OF SERUM POTASSIUM: CPT | Performed by: INTERNAL MEDICINE

## 2021-09-08 PROCEDURE — 36415 COLL VENOUS BLD VENIPUNCTURE: CPT | Performed by: EMERGENCY MEDICINE

## 2021-09-08 PROCEDURE — 120N000001 HC R&B MED SURG/OB

## 2021-09-08 RX ORDER — SODIUM CHLORIDE 9 MG/ML
INJECTION, SOLUTION INTRAVENOUS CONTINUOUS
Status: DISCONTINUED | OUTPATIENT
Start: 2021-09-08 | End: 2021-09-08

## 2021-09-08 RX ORDER — DEXTROSE MONOHYDRATE, SODIUM CHLORIDE, AND POTASSIUM CHLORIDE 50; 1.49; 4.5 G/1000ML; G/1000ML; G/1000ML
INJECTION, SOLUTION INTRAVENOUS CONTINUOUS
Status: DISCONTINUED | OUTPATIENT
Start: 2021-09-08 | End: 2021-09-08

## 2021-09-08 RX ORDER — ONDANSETRON 4 MG/1
4 TABLET, ORALLY DISINTEGRATING ORAL EVERY 6 HOURS PRN
Status: DISCONTINUED | OUTPATIENT
Start: 2021-09-08 | End: 2021-09-09 | Stop reason: HOSPADM

## 2021-09-08 RX ORDER — LIDOCAINE 40 MG/G
CREAM TOPICAL
Status: DISCONTINUED | OUTPATIENT
Start: 2021-09-08 | End: 2021-09-09 | Stop reason: HOSPADM

## 2021-09-08 RX ORDER — NICOTINE 21 MG/24HR
1 PATCH, TRANSDERMAL 24 HOURS TRANSDERMAL DAILY
Status: DISCONTINUED | OUTPATIENT
Start: 2021-09-08 | End: 2021-09-09 | Stop reason: HOSPADM

## 2021-09-08 RX ORDER — AMOXICILLIN 250 MG
2 CAPSULE ORAL 2 TIMES DAILY PRN
Status: DISCONTINUED | OUTPATIENT
Start: 2021-09-08 | End: 2021-09-09 | Stop reason: HOSPADM

## 2021-09-08 RX ORDER — AMOXICILLIN 250 MG
1 CAPSULE ORAL 2 TIMES DAILY PRN
Status: DISCONTINUED | OUTPATIENT
Start: 2021-09-08 | End: 2021-09-09 | Stop reason: HOSPADM

## 2021-09-08 RX ORDER — POTASSIUM CHLORIDE 1500 MG/1
20 TABLET, EXTENDED RELEASE ORAL ONCE
Status: COMPLETED | OUTPATIENT
Start: 2021-09-08 | End: 2021-09-08

## 2021-09-08 RX ORDER — ONDANSETRON 2 MG/ML
4 INJECTION INTRAMUSCULAR; INTRAVENOUS EVERY 6 HOURS PRN
Status: DISCONTINUED | OUTPATIENT
Start: 2021-09-08 | End: 2021-09-09 | Stop reason: HOSPADM

## 2021-09-08 RX ADMIN — CLONIDINE HYDROCHLORIDE 0.1 MG: 0.1 TABLET ORAL at 16:16

## 2021-09-08 RX ADMIN — POTASSIUM CHLORIDE 20 MEQ: 1500 TABLET, EXTENDED RELEASE ORAL at 17:20

## 2021-09-08 RX ADMIN — POTASSIUM CHLORIDE, DEXTROSE MONOHYDRATE AND SODIUM CHLORIDE: 150; 5; 450 INJECTION, SOLUTION INTRAVENOUS at 12:56

## 2021-09-08 RX ADMIN — NICOTINE 1 PATCH: 14 PATCH, EXTENDED RELEASE TRANSDERMAL at 17:19

## 2021-09-08 RX ADMIN — MAGNESIUM OXIDE TAB 400 MG (241.3 MG ELEMENTAL MG) 400 MG: 400 (241.3 MG) TAB at 08:21

## 2021-09-08 RX ADMIN — FOLIC ACID 1 MG: 1 TABLET ORAL at 08:21

## 2021-09-08 RX ADMIN — POTASSIUM CHLORIDE 40 MEQ: 1.5 POWDER, FOR SOLUTION ORAL at 00:38

## 2021-09-08 RX ADMIN — CLONIDINE HYDROCHLORIDE 0.1 MG: 0.1 TABLET ORAL at 08:21

## 2021-09-08 RX ADMIN — LEVETIRACETAM 500 MG: 500 TABLET, FILM COATED ORAL at 08:22

## 2021-09-08 RX ADMIN — LEVETIRACETAM 500 MG: 500 TABLET, FILM COATED ORAL at 20:59

## 2021-09-08 RX ADMIN — CLONIDINE HYDROCHLORIDE 0.1 MG: 0.1 TABLET ORAL at 00:38

## 2021-09-08 RX ADMIN — MULTIPLE VITAMINS W/ MINERALS TAB 1 TABLET: TAB at 08:21

## 2021-09-08 RX ADMIN — THIAMINE HCL TAB 100 MG 100 MG: 100 TAB at 08:21

## 2021-09-08 ASSESSMENT — ACTIVITIES OF DAILY LIVING (ADL)
ADLS_ACUITY_SCORE: 20
ADLS_ACUITY_SCORE: 18
PREVIOUS_RESPONSIBILITIES: MEAL PREP;HOUSEKEEPING;LAUNDRY;SHOPPING;MEDICATION MANAGEMENT;YARDWORK;FINANCES;DRIVING;WORK

## 2021-09-08 NOTE — PROGRESS NOTES
[facility]    Hospitalist Progress Note    Date of Service (when I saw the patient): 09/08/2021    Assessment & Plan   Ivon Sheets is a 51 year old female who was admitted on 9/7/2021.  Assessment & Plan     Ivon Sheets is a 51 year old female with a history of alcohol use disorder, seizure  following alcohol withdrawal with associated fall and subarachnoid bleed presents to the emergency department following an episode of seizure at home.  Active Problems:  Alcohol use disorder    Nonintractable epilepsy without status epilepticus, unspecified epilepsy type (H)  Elevated LFTs  --- 6/2/2020 patient was admitted to [facility] for an episode of seizure (alcohol withdrawal seizure) with a fall resulting in traumatic hemorrhage of the right cerebrum with loss of consciousness.She had stable hemorrhagic parenchymal contusions in the right temporal lobe, frontal operculum and high bilateral parietal lobes.  Patient was discharged on Keppra, since then she followed up outpatient with neurology and Keppra was continued, patient did not quit drinking alcohol, she continues to drink 2-4 beers every day.  (Patient is not a good historian regarding amount of alcohol she drinks).  ---Current admission following an episode of witnessed tonic-clonic seizure at home (seen by roommate), her last drink was 24 hours ago, alcohol levels are negative here.  There is conflicting information regarding her Keppra usage, she mentioned that she ran out of Keppra since yesterday and have not had her morning dose, I do not think she was started on Keppra for seizure disorder.  --- this possibly could be a alcohol withdrawal seizure, not sure the significance of patient not being consistent with Keppra dosage.  ---We will place on CIWA scale due to high risk for alcohol withdrawal.  --- Keppra levels are ordered in the ED, she was given 1 g of Keppra IV in the ER.  EEG done results are currently  pending  Neurology consulted and are following appreciate assistance  No further recurrence of seizures since admission    Acute hyponatremia on chronic hyponatremia  --- Multifactorial, possibly secondary to hypovolemic status, beer drinkers portal behzad.   Sodium improved to 126 from 120 on admission  Will change IV fluids from normal saline to half-normal saline at 50 mL/h to a low slow improvement in sodium levels      Tobacco use disorder  ---We will order nicotine patch, 13-pack-year history of smoking noted .  --- In the past patient had taken Wellbutrin and Chantix with minimal improvement and side effects.    Hypertension  ---PTA on amlodipine, currently blood pressures are low we will hold off on it for now.  --- We will wait for medication reconciliation, it seems patient was also started on metoprolol in the past, she is not able to give me a good history.    Hypomagnesemia  Hypokalemia  ---PTA 1 magnesium 400 mg daily, will continue that  Vitamin D deficiency, PTA on vitamin D, will continue that.  Hypokalemia  ---40 mg of KCl ordered, protocol going forward.    DVT Prophylaxis: Low Risk/Ambulatory with no VTE prophylaxis indicated  Code Status: Full Code     Disposition: Expected discharge in 24-48 hours depending on sodium correction and further work-up monitoring for alcohol withdrawal symptoms and treatment     Patient is adamant on leaving the hospital today and discussed with patient and her mother that her sodium is still low with a symptomatic hyponatremia resulting in seizure and her high risk for alcohol withdrawal with her stopping drinking alcohol since yesterday we need to monitor for at least for the next 24 hours for any further recurrence of seizures and for alcohol withdrawal symptoms and she is not ready for discharge today    We will advance her to inpatient status with further need for IV fluids with a low sodium levels and monitoring for recurrent seizures    Melinda Allison,  MD  [phone] (P)      Interval History   Patient is resting comfortably in bed.  Has some bruising of the face.  Her mother is at bedside.  Patient had previous alcohol treatment twice in the past.  She states she knows what to do to stop drinking alcohol.  Wants to go home later today.  Discussed with her need for hospitalization for at least for the next 24 hours with the hyponatremia seizures and alcohol withdrawal risk and low potassium levels today  -Data reviewed today: I reviewed all new labs and imaging results over the last 24 hours. I personally reviewed no images or EKG's today.    Physical Exam   Temp: 98.8  F (37.1  C) Temp src: Oral BP: 102/60 Pulse: 76   Resp: 18 SpO2: 98 % O2 Device: None (Room air)    Vitals:    09/07/21 1847   Weight: 45.4 kg (100 lb)     Vital Signs with Ranges  Temp:  [98.5  F (36.9  C)-98.8  F (37.1  C)] 98.8  F (37.1  C)  Pulse:  [69-99] 76  Resp:  [18] 18  BP: ()/(60-95) 102/60  SpO2:  [93 %-99 %] 98 %  I/O last 3 completed shifts:  In: 100 [IV Piggyback:100]  Out: -     Constitutional: Awake, alert, cooperative, no apparent distress  Respiratory: Clear to auscultation bilaterally, no crackles or wheezing  Cardiovascular: Regular rate and rhythm, normal S1 and S2, and no murmur noted  GI: Normal bowel sounds, soft, non-distended, non-tender  Skin/Integumen: No rashes, no cyanosis, no edema  Other:     Medications     dextrose 5% and 0.45% NaCl + KCl 20 mEq/L 50 mL/hr at 09/08/21 1256       cloNIDine  0.1 mg Oral Q8H     folic acid  1 mg Oral Daily     levETIRAcetam  500 mg Oral BID     magnesium oxide  400 mg Oral Daily     multivitamin w/minerals  1 tablet Oral Daily     sodium chloride (PF)  3 mL Intracatheter Q8H     thiamine  100 mg Oral Daily       Data   Recent Labs   Lab 09/08/21  0948 09/08/21  0618 09/08/21  0115 09/07/21  1857   WBC  --   --   --  8.6   HGB  --   --   --  12.2   MCV  --   --   --  94   PLT  --   --   --  107*   * 122* 121* 120*    POTASSIUM 3.1*  --   --  3.1*   CHLORIDE  --   --   --  87*   CO2  --   --   --  19*   BUN  --   --   --  3*   CR  --   --   --  0.42*   ANIONGAP  --   --   --  14   CLAUDIA  --   --   --  9.2   GLC  --   --   --  101*   ALBUMIN  --   --   --  4.0   PROTTOTAL  --   --   --  7.4   BILITOTAL  --   --   --  0.8   ALKPHOS  --   --   --  93   ALT  --   --   --  60*   AST  --   --   --  93*       Recent Results (from the past 24 hour(s))   Head CT w/o contrast    Narrative    EXAM: CT HEAD W/O CONTRAST  LOCATION: [facility]  DATE/TIME: 9/7/2021 11:24 PM    INDICATION: Seizure, nontraumatic (Age >= 41y)  COMPARISON: 7/24/2020  TECHNIQUE: Routine CT Head without IV contrast. Multiplanar reformats. Dose reduction techniques were used.    FINDINGS:  INTRACRANIAL CONTENTS: No intracranial hemorrhage, extraaxial collection, or mass effect.  No CT evidence of acute infarct. Normal parenchymal attenuation. Prominent atrophy for age.     VISUALIZED ORBITS/SINUSES/MASTOIDS: No intraorbital abnormality. No paranasal sinus mucosal disease. No middle ear or mastoid effusion.    BONES/SOFT TISSUES: No acute abnormality.      Impression    IMPRESSION:  1.  No acute intracranial process.  2.  Advanced atrophy for age.

## 2021-09-08 NOTE — H&P
[facility]    History and Physical  Hospitalist       Date of Admission:  9/7/2021    Assessment & Plan   Ivon Sheets is a 51 year old female with a history of alcohol use disorder, seizure  following alcohol withdrawal with associated fall and subarachnoid bleed presents to the emergency department following an episode of seizure at home.  Active Problems:  Alcohol use disorder    Nonintractable epilepsy without status epilepticus, unspecified epilepsy type (H)  Elevated LFTs  --- 6/2/2020 patient was admitted to [facility] for an episode of seizure (alcohol withdrawal seizure) with a fall resulting in traumatic hemorrhage of the right cerebrum with loss of consciousness.She had stable hemorrhagic parenchymal contusions in the right temporal lobe, frontal operculum and high bilateral parietal lobes.  Patient was discharged on Keppra, since then she followed up outpatient with neurology and Keppra was continued, patient did not quit drinking alcohol, she continues to drink 2-4 beers every day.  (Patient is not a good historian regarding amount of alcohol she drinks).  ---Current admission following an episode of witnessed tonic-clonic seizure at home (seen by roommate), her last drink was 24 hours ago, alcohol levels are negative here.  There is conflicting information regarding her Keppra usage, she mentioned that she ran out of Keppra since yesterday and have not had her morning dose, I do not think she was started on Keppra for seizure disorder.  ---Will admit to observation telemetry, continue seizure precautions, neurochecks every 4 hours, will request neurology consult, will obtain an EEG, this possibly could be a alcohol withdrawal seizure, not sure the significance of patient not being consistent with Keppra dosage.  ---We will place on CIWA scale due to high risk for alcohol withdrawal.  ---I am releasing many of these orders in the emergency department as the patient may not  find a bed in next 12 hours.  We will order a magnesium levels as well.  --- Keppra levels are ordered in the ED, she was given 1 g of Keppra IV in the ER.  Acute hyponatremia on chronic hyponatremia  --- Multifactorial, possibly secondary to hypovolemic status, beer drinkers portal behzad.  Ordered urine sodium, will obtain serum osmolality, start on IV fluids, normal saline at 75 mill per hour, goal is 6 to 8 mEq in next 24 hours, will repeat electrolytes every 4 hours for now, patient has baseline sodium 128-129.  --- Patient had mentioned few episodes of nausea and 1 episode of loose bowel movement.    Tobacco use disorder  ---We will order nicotine patch, 13-pack-year history of smoking noted .  --- In the past patient had taken Wellbutrin and Chantix with minimal improvement and side effects.  Hypertension  ---PTA on amlodipine, currently blood pressures are low we will hold off on it for now.  --- We will wait for medication reconciliation, it seems patient was also started on metoprolol in the past, she is not able to give me a good history.  Hypomagnesemia  ---PTA 1 magnesium 400 mg daily, will continue that  Vitamin D deficiency, PTA on vitamin D, will continue that.  Hypokalemia  ---40 mg of KCl ordered, protocol going forward.  DVT Prophylaxis: Low Risk/Ambulatory with no VTE prophylaxis indicated  Code Status: Full Code    Disposition: Expected discharge in 24-48 hours depending on sodium correction and further work-up    Stacey Grijalva MD    Primary Care Physician   FRANCISCO PARKER    Chief Complaint   Seizure 1 episode    History is obtained from the patient  Family and medical records    History of Present Illness   Ivon Sheets is a 51 year old female with a history of alcohol use disorder, traumatic brain injury in 6/2020 following a alcohol withdrawal seizure presents to the emergency department brought in by EMS for evaluation of a seizure happened at home.  Her seizure was witnessed by her  roommate when it lasted for 2minutes, no history of fall as per report, patient was postictal when she presented to the ED so history was quite vague, history obtained from the ER records and EMS reports.  Patient during my visit was more awake but goes on a tangential speech was unable to recollect most of the events, she only remember being in the hospital.  She is on Keppra 500 mg p.o. twice daily, mentions that she takes them regularly, her last dose was 24 hours ago she had ran out of the dosage, she drinks to 12 ounce beer as per report, patient mentions being gainfully employed at the [employer] and is working from home.  She is unable to give me any history regarding any breakthrough seizures in the past and since that she is on Keppra, patient have been cut down on her alcohol consumption.  Patient mentions few episodes of nausea and vomiting couple of days ago, not sure whether this is related to binge drinking or gastritis.  She denied having any diarrhea, fever, chills at home.  Patient's mother was near bedside.  In the emergency department she was noted to have multiple electrolyte abnormalities including hypokalemia, hyponatremia, it seems patient has chronic hyponatremia due to nutritional issues, she mentions that she does not eat at home, when asked the reason for that she was unable to give me a reason, she started talking about a different issue.      Past Medical History    I have reviewed this patient's medical history and updated it with pertinent information if needed.   Past Medical History:   Diagnosis Date     Alcohol dependence (H)      Depression      Hypertension      Hyponatremia      Polysubstance (excluding opioids) dependence, daily use (H)     marijuana use     Seizures (H)      Takotsubo cardiomyopathy      Tobacco use disorder        Past Surgical History   I have reviewed this patient's surgical history and updated it with pertinent information if needed.  Past  Surgical History:   Procedure Laterality Date     EYE SURGERY         Prior to Admission Medications   Prior to Admission Medications   Prescriptions Last Dose Informant Patient Reported? Taking?   VITAMIN D3 50 MCG (2000 UT) tablet 9/7/2021 at Unknown time Self Yes Yes   Sig: Take 1 tablet by mouth daily   amLODIPine (NORVASC) 10 MG tablet 9/7/2021 at Unknown time Self Yes Yes   Sig: Take 10 mg by mouth daily    levETIRAcetam (KEPPRA) 500 MG tablet 9/6/2021 at Unknown time Self Yes Yes   Sig: Take 500 mg by mouth 2 times daily   magnesium oxide (MAG-OX) 400 MG tablet 9/7/2021 at Unknown time Self Yes Yes   Sig: Take 400 mg by mouth      Facility-Administered Medications: None     Allergies   No Known Allergies    Social History   I have reviewed this patient's social history and updated it with pertinent information if needed. Ivon Sheets  reports that she has been smoking cigarettes. She has a 13.00 pack-year smoking history. She has never used smokeless tobacco. She reports current alcohol use of about 4.0 standard drinks of alcohol per week. She reports previous drug use.    Family History   I have reviewed this patient's family history and updated it with pertinent information if needed.   Family History   Problem Relation Age of Onset     Coronary Artery Disease Father      Alcoholism Brother      Alcoholism Brother        Review of Systems   The 10 point Review of Systems is negative other than noted in the HPI or here.    Physical Exam   Temp: 98.5  F (36.9  C) Temp src: Oral BP: 112/80 Pulse: 96   Resp: 18 SpO2: 97 % O2 Device: None (Room air)    Vital Signs with Ranges  Temp:  [98.5  F (36.9  C)] 98.5  F (36.9  C)  Pulse:  [80-96] 96  Resp:  [18] 18  BP: (112-146)/(80-95) 112/80  SpO2:  [93 %-97 %] 97 %  100 lbs 0 oz    Constitutional:Awake, very sleepy, occasionally confused  Eyes: Conjunctiva and pupils examined and normal.  HEENT: Moist mucous membranes, normal dentition.  Respiratory: Clear to  auscultation bilaterally, no crackles or wheezing.  Cardiovascular: Regular rate and rhythm, normal S1 and S2, and no murmur noted.  GI: Soft, non-distended, non-tender, normal bowel sounds.  Lymph/Hematologic: No anterior cervical or supraclavicular adenopathy.  Skin: No rashes, no cyanosis, no edema.  Musculoskeletal: No joint swelling, erythema or tenderness.  Neurologic: Cranial nerves 2-12 intact, normal strength and sensation.  Psychiatric: Alert, oriented to person, place and time, occasionally confused    Data   Data reviewed today:  I personally reviewed labs and imaging below  Recent Labs   Lab 09/07/21  1857   WBC 8.6   HGB 12.2   MCV 94   *   *   POTASSIUM 3.1*   CHLORIDE 87*   CO2 19*   BUN 3*   CR 0.42*   ANIONGAP 14   CLAUDIA 9.2   *   ALBUMIN 4.0   PROTTOTAL 7.4   BILITOTAL 0.8   ALKPHOS 93   ALT 60*   AST 93*       Imaging:  No results found for this or any previous visit (from the past 24 hour(s)).

## 2021-09-08 NOTE — PROGRESS NOTES
A routine EEG completed for . Pt. Was cooperative during the procedure however, pt stated she was anxious and had a difficulty relaxing. There was some muscle artifact noted during the procedure. Photic stimulation performed and good photic driving noted.

## 2021-09-08 NOTE — PROGRESS NOTES
"   09/08/21 1342   Quick Adds   Type of Visit Initial PT Evaluation   Living Environment   People in home friend(s)   Current Living Arrangements house   Home Accessibility stairs to enter home;stairs within home   Number of Stairs, Main Entrance 3   Stair Railings, Main Entrance railing on right side (ascending)   Number of Stairs, Within Home, Primary 10   Stair Railings, Within Home, Primary railing on right side (ascending)   Self-Care   Usual Activity Tolerance good   Current Activity Tolerance moderate   Regular Exercise No   Equipment Currently Used at Home none   Disability/Function   Fall history within last six months no   General Information   Onset of Illness/Injury or Date of Surgery 09/07/21   Referring Physician Stacey Grijalva MD   Patient/Family Therapy Goals Statement (PT) Return home   Pertinent History of Current Problem (include personal factors and/or comorbidities that impact the POC) Pt admitted under observation status with a seizure, acute on chronic hyponatremia, hypomagnesemia. PMH: ETOH use, HTN, ICH, polysubstance use, tacotsubo cardiomyopathy.   Existing Precautions/Restrictions fall   Weight-Bearing Status - LLE full weight-bearing   Weight-Bearing Status - RLE full weight-bearing   Cognition   Orientation Status (Cognition) oriented x 4   Follows Commands (Cognition) WNL   Pain Assessment   Patient Currently in Pain No   Posture    Posture Forward head position;Protracted shoulders   Range of Motion (ROM)   ROM Quick Adds ROM WNL   Strength   Strength Comments B hip flex 4 to 4+/5, knee ext 5/5, DF 5/5   Bed Mobility   Comment (Bed Mobility) Independent   Transfers   Transfer Safety Comments independent sit to/from stand   Gait/Stairs (Locomotion)   Comment (Gait/Stairs) Pt amb 10 ft without AD and CGA, appears steady   Balance   Balance Comments Balance mildly dec at times. Pt reports she had ankle surgery 1 year ago and her ankles makes her \"wobble sometimes.\"   Sensory Examination "   Sensory Perception patient reports no sensory changes   Clinical Impression   Criteria for Skilled Therapeutic Intervention yes, treatment indicated   PT Diagnosis (PT) Difficulty ambulating   Influenced by the following impairments Dec balance   Functional limitations due to impairments Difficulty ambulating   Clinical Presentation Stable/Uncomplicated   Clinical Presentation Rationale medically stable   Clinical Decision Making (Complexity) low complexity   Therapy Frequency (PT) Daily   Predicted Duration of Therapy Intervention (days/wks) 3 days   Planned Therapy Interventions (PT) gait training   Risk & Benefits of therapy have been explained evaluation/treatment results reviewed;care plan/treatment goals reviewed;risks/benefits reviewed;current/potential barriers reviewed;participants voiced agreement with care plan   PT Discharge Planning    PT Discharge Recommendation (DC Rec) home   PT Rationale for DC Rec Pt is safe to return home at this time. Is moving safely.   PT Brief overview of current status  Ind bed mobility and transfers, SBA gait in hallway   Total Evaluation Time   Total Evaluation Time (Minutes) 15

## 2021-09-08 NOTE — CONSULTS
"CLINICAL NUTRITION SERVICES BRIEF NOTE  Consult received - \"Pt/family request\"    **PATIENT IS OBS STATUS**    New Findings  - Spoke to patient by phone. She has no questions or concerns at this time. She is awaiting her lunch tray. Regular diet ordered.     Intervention  - Full assessment not indicated as pt is obs status. Please re consult should patient transition to inpatient.     Gilda Alejandra RD, LD  [facility] [place], [address], 55, [facility]   Pager: [phone]  Weekend Pager: [phone]    "

## 2021-09-08 NOTE — ED PROVIDER NOTES
51-year-old history of epilepsy, alcohol abuse presenting for seizure.  Moderate hyponatremia at 121 noted.  No evidence of alcohol withdrawal here in the emergency department.    Neal Montemayor MD  [employer]  7:19 AM 09/08/21        Neal Montemayor MD  09/08/21 0719

## 2021-09-08 NOTE — ED NOTES
[facility]  ED Nurse Handoff Report    ED Chief complaint: Seizures (pt has been running out of kepra and has been taking it spiradically had a seizure today lasted about 2 min wittnessed by roomate)      ED Diagnosis:   Final diagnoses:   Nonintractable epilepsy without status epilepticus, unspecified epilepsy type (H)   Hyponatremia   Alcoholism (H)       Code Status: Full Code    Allergies: No Known Allergies    Patient Story: pt had a sz today at home. Pt is on keppra and has been taking it sporadically to save it.   Pt is being admitted for low sodium levels.   Focused Assessment:  AO in the ED. Still unsteady on feet. keppra loaded    Treatments and/or interventions provided: labs, head CT, K and Na replacement.  Patient's response to treatments and/or interventions: pt slightly more steady.     To be done/followed up on inpatient unit:  monitor electrolytes     Does this patient have any cognitive concerns?: no    Activity level - Baseline/Home:  Independent  Activity Level - Current:   Stand with Assist    Patient's Preferred language: English   Needed?: No    Isolation: None  Infection: Not Applicable  Patient tested for COVID 19 prior to admission: YES  Bariatric?: No    Vital Signs:   Vitals:    09/07/21 2300 09/08/21 0038 09/08/21 0040 09/08/21 0240   BP: 94/63 118/75 118/75 101/63   Pulse: 81  78    Resp:       Temp:       TempSrc:       SpO2: 97%  96% 97%   Weight:       Height:           Cardiac Rhythm:     Was the PSS-3 completed:   Yes  What interventions are required if any?               Family Comments: mother at bedside  OBS brochure/video discussed/provided to patient/family: Yes              Name of person given brochure if not patient:               Relationship to patient:     For the majority of the shift this patient's behavior was Green.   Behavioral interventions performed were no.    ED NURSE PHONE NUMBER: ED [id number]

## 2021-09-08 NOTE — PLAN OF CARE
"Summary: Seizure at home yesterday. Hyponatremia. Alcohol abuse.   DATE & TIME: 09/08/21 8170-5985  Cognitive Concerns/ Orientation : A & O x4. But does not remember seizure from yesterday.   BEHAVIOR & AGGRESSION TOOL COLOR: Yellow   CIWA SCORE: 7  ABNL VS/O2: VSS on RA  MOBILITY: SBA  PAIN MANAGMENT: Denies  DIET: Regular, poor oral intake.   BOWEL/BLADDER: No issues per pt report.   ABNL LAB/BG: Sodium 126 and K+ 3.1. Spoke with MD verbally to order K+ protocol.   DRAIN/DEVICES: PIV infusing D5 in 1/2 NS w/ K+ 20 mEq @ 50 mL/hr.   TELEMETRY RHYTHM: N/A  SKIN: Pale/flushed. Many scattered bruising.   TESTS/PROCEDURES: MRI ordered, pt currently refusing checklist printed and infront of chart.   D/C DAY/GOALS/PLACE: Pending  OTHER IMPORTANT INFO: Neurology stated \"Focal neurological deficits, most likely related to h/o trauma and ICH but a brain MRI is recommended to evaluate for intracranial pathology with h/o recurring seizures\". Pt refusing MRI. Pt requesting to discharge, MD spoke with pt. Would have to be AMA. Mother and pt speaking about it. Sodium q4hrs.   "

## 2021-09-08 NOTE — PROGRESS NOTES
09/08/21 1533   Quick Adds   Type of Visit Initial Occupational Therapy Evaluation   Living Environment   People in home friend(s)   Current Living Arrangements house   Home Accessibility stairs to enter home;stairs within home   Number of Stairs, Main Entrance 3   Stair Railings, Main Entrance railing on right side (ascending)   Number of Stairs, Within Home, Primary 10   Stair Railings, Within Home, Primary railing on right side (ascending)   Transportation Anticipated car, drives self;family or friend will provide   Living Environment Comments Pt lives in rambler-style home, has walk-in shower, all needs met on main floor   Self-Care   Usual Activity Tolerance good   Current Activity Tolerance moderate   Regular Exercise No   Equipment Currently Used at Home none   Activity/Exercise/Self-Care Comment Pt reports IND at baseline with I/ADLs and functional mobility, works full time as an    Instrumental Activities of Daily Living (IADL)   Previous Responsibilities meal prep;housekeeping;laundry;shopping;medication management;yardwork;finances;driving;work   Disability/Function   Hearing Difficulty or Deaf no   Wear Glasses or Blind yes   Vision Management reading glasses   Concentrating, Remembering or Making Decisions Difficulty no   Difficulty Communicating no   Difficulty Eating/Swallowing no   Walking or Climbing Stairs Difficulty no   Dressing/Bathing Difficulty no   Toileting issues no   Doing Errands Independently Difficulty (such as shopping) no   Fall history within last six months no   General Information   Onset of Illness/Injury or Date of Surgery 09/07/21   Referring Physician Stacey Grijalva MD   Patient/Family Therapy Goal Statement (OT) Ivon Sheets is a 51 year old female with a history of alcohol use disorder, seizure  following alcohol withdrawal with associated fall and subarachnoid bleed presents to the emergency department following an episode of seizure at home.    Existing Precautions/Restrictions fall   Cognitive Status Examination   Orientation Status orientation to person, place and time   Affect/Mental Status (Cognitive) WFL   Follows Commands WFL   Cognitive Status Comments Pt disappointed about not being able to discharge today but pleasant and cooperative during visit. SLUMs score 28/30, current score in normal range   Visual Perception   Visual Impairment/Limitations corrective lenses for reading   Sensory   Sensory Quick Adds No deficits were identified   Pain Assessment   Patient Currently in Pain No   Integumentary/Edema   Integumentary/Edema no deficits were identifed   Posture   Posture not impaired   Range of Motion Comprehensive   General Range of Motion no range of motion deficits identified   Strength Comprehensive (MMT)   General Manual Muscle Testing (MMT) Assessment no strength deficits identified   Muscle Tone Assessment   Muscle Tone Quick Adds No deficits were identified   Coordination   Upper Extremity Coordination No deficits were identified   Bed Mobility   Bed Mobility supine-sit;sit-supine   Supine-Sit [place] (Bed Mobility) independent   Sit-Supine [place] (Bed Mobility) independent   Transfers   Transfers sit-stand transfer   Sit-Stand Transfer   Sit-Stand [place] (Transfers) independent   Activities of Daily Living   BADL Assessment grooming;lower body dressing   Lower Body Dressing Assessment   [place] Level (Lower Body Dressing) independent   Grooming Assessment   [place] Level (Grooming) independent   Clinical Impression   Criteria for Skilled Therapeutic Interventions Met (OT) yes;meets criteria;skilled treatment is necessary   OT Diagnosis Impaired independence with I/ADLs   OT Problem List-Impairments impacting ADL activity tolerance impaired;cognition   Assessment of Occupational Performance 1-3 Performance Deficits   Identified Performance Deficits higher level I/ADLs   Planned Therapy Interventions (OT)  cognition   Clinical Decision Making Complexity (OT) low complexity   Therapy Frequency (OT) 1x eval and treat   Risk & Benefits of therapy have been explained evaluation/treatment results reviewed;care plan/treatment goals reviewed;risks/benefits reviewed;current/potential barriers reviewed;participants voiced agreement with care plan;participants included;patient   OT Discharge Planning    OT Discharge Recommendation (DC Rec) home   OT Rationale for DC Rec Pt appears close to baseline of independence with I/ADLs and functional mobility, no cognitive impairments upon evaluation. Anticipate no further OT needs, PT to continue seeing for mobility   Total Evaluation Time (Minutes)   Total Evaluation Time (Minutes) 10

## 2021-09-08 NOTE — PHARMACY-ADMISSION MEDICATION HISTORY
Pharmacy Medication History  Admission medication history interview status for the 9/7/2021  admission is complete. See EPIC admission navigator for prior to admission medications     Location of Interview: Phone  Medication history sources: Patient, Surescripts and Care Everywhere    Significant changes made to the medication list:  Removed metoprolol, Norco, omeprazole, thiamine per patient report     In the past week, patient estimated taking medication this percent of the time: 50-90% due to other    Additional medication history information:   Per surescripts: there are no records of amlodipine being filled. Levetiracetam was filled today 9/7/21, but previous fill was 1/29/21 for a 90 days supply. Patient tells me she took levetiracetam yesterday, prior to admission.    Medication reconciliation completed by provider prior to medication history? No    Time spent in this activity: 15 minutes     Prior to Admission medications    Medication Sig Last Dose Taking? Auth Provider   amLODIPine (NORVASC) 10 MG tablet Take 10 mg by mouth daily  9/7/2021 at Unknown time Yes Reported, Patient   levETIRAcetam (KEPPRA) 500 MG tablet Take 500 mg by mouth 2 times daily 9/6/2021 at Unknown time Yes Nawaf Bolivar MD   magnesium oxide (MAG-OX) 400 MG tablet Take 400 mg by mouth 9/7/2021 at Unknown time Yes Reported, Patient   VITAMIN D3 50 MCG (2000 UT) tablet Take 1 tablet by mouth daily 9/7/2021 at Unknown time Yes Reported, Patient       The information provided in this note is only as accurate as the sources available at the time of update(s)

## 2021-09-08 NOTE — CONSULTS
.              [facility]    Neurology Consultation    Ivon Sheets MRN# [id number]   YOB: 1970 Age: 51 year old    Code Status:Full Code   Date of Admission: 9/7/2021  Date of Consult:09/08/2021                                                                                       Assessment and Plan:                                         #.  Recurring generalized seizures in the setting of a history for alcohol abuse, most likely alcohol withdrawal seizures, compounded by acute on chronic hyponatremia and malnutrition.  EEG 9/7/2021 did not really reveal any abnormal focal or epileptiform discharges.    #.  Past history for traumatic hemorrhage 6/3/2020, bilateral and right temporal hemorrhage history for  *History for alcohol abuse    Recommendations:  # Focal neurological deficits, most likely related to h/o trauma and ICH but a brain MRI is recommended to evaluate for intracranial pathology with h/o recurring seizures.  #.  Continue vital signs with neuro checks and seizure precautions       There is no clear history for epilepsy, it would appear that the patient has had recurring spells, seizures alcohol withdrawal and as explained to her, and antiepileptic medication will not prevent further seizure activity, however, the patient indicated that she preferred to remain on Keppra and hence we will should continue Keppra 500 mg twice daily.  #Continue vitamin supplementation, nutrition consult electrolyte replacement as per protocol.  Following discharge, would recommend that the patient follow up with outpatient neurology at the [facility], [facility] in 4 to 6 weeks.  # Keppra level,TSH  #Counseling was provided regarding alcohol abstinence.  Counseling was also reviewed provided regarding her nicotine use  #. DVT Prophylaxis  -Patient is able to ambulate  #. PT/OT/Speech   Hold evaluations      Reason for consult:        Chief Complaint:   Witnessed generalized  tonic-clonic seizure with a history of alcohol abuse consult possibly alcohol withdrawal seizure.  History is obtained from the patient , her mother and review of her medical records she chart       History of Present Illness:   This patient is a 51 year old right-handed female patient  who was brought to the emergency after her roommate reported witnessing a generalized tonic-clonic seizure yesterday morning.  Patient admitted to drinking alcohol daily, up to 5-6 beers sometimes in 1 day, she could not recall whether she had alcohol the day prior to her having witnessed seizure but she recalled that the day prior she was experiencing nausea and vomiting and her blood alcohol level on arrival here was 0.  She reported no recent head trauma, fever but has had significant weight loss due to poor appetite.  Ms. Rodriguez is a 51-year-old patient who was involved for polysubstance abuse, alcohol abuse and chronic hyponatremia and seizures.  She stated that she had a seizure 3 years ago which was, which was witnessed by her family, her mother recall that she was seated and began shaking, stood up and then fell backwards and had weakness jerking movements of all 4 extremities, not associated with urinary incontinence.  The second spell occurred 1 year ago and as a result of that she had a significant fall .She states she was in a gas station when the she was reaching for a credit cards in the next and she knew is that she was told that she had had a seizure, apparently the patient fell sustaining head trauma was seen in the ED, 6/3/2020 it was noted that she was given ER and subsequently the CT of the head showed bilateral parietal and right temporal hemorrhages minimal subarachnoid hemorrhage she was seen by neurosurgery and surgical intervention was not recommended.  She was started on Keppra, EEG was noted as  showing nonspecific slowing.  The follow-up/CT scan demonstrating multilevel area compartmental band resolution of  the right parietal scalp he punched hematoma.  She was discharged on Keppra and at that time was noted to have significant hyponatremia and was referred to an endocrinologist.       Past Medical History:     Past Medical History:   Diagnosis Date     Alcohol dependence (H)      Depression      Hypertension      Hyponatremia      Polysubstance (excluding opioids) dependence, daily use (H)     marijuana use     Seizures (H)      Takotsubo cardiomyopathy      Tobacco use disorder          Past Surgical History:     Past Surgical History:   Procedure Laterality Date     EYE SURGERY            Social History:     Social History     Socioeconomic History     Marital status: Single     Spouse name: None     Number of children: 0     Years of education: None     Highest education level: None   Occupational History     None   Tobacco Use     Smoking status: Current Every Day Smoker     Packs/day: 0.50     Years: 26.00     Pack years: 13.00     Types: Cigarettes     Smokeless tobacco: Never Used   Substance and Sexual Activity     Alcohol use: Yes     Alcohol/week: 4.0 standard drinks     Types: 4 Cans of beer per week     Comment: Alcoholic Drinks/day: 3-4 beers/day     Drug use: Not Currently     Comment: Drug use: marijuana     Sexual activity: None   Other Topics Concern     None   Social History Narrative     None     Social Determinants of Health     Financial Resource Strain:      Difficulty of Paying Living Expenses:    Food Insecurity:      Worried About Running Out of Food in the Last Year:      Ran Out of Food in the Last Year:    Transportation Needs:      Lack of Transportation (Medical):      Lack of Transportation (Non-Medical):    Physical Activity:      Days of Exercise per Week:      Minutes of Exercise per Session:    Stress:      Feeling of Stress :    Social Connections:      Frequency of Communication with Friends and Family:      Frequency of Social Gatherings with Friends and Family:      Attends  Religion Services:      Active Member of Clubs or Organizations:      Attends Club or Organization Meetings:      Marital Status:    Intimate Partner Violence:      Fear of Current or Ex-Partner:      Emotionally Abused:      Physically Abused:      Sexually Abused:      Patient is a smoker of 1/2 pk daily,and admits to having 5-6 beers daily., denies illicit drugs use but has a history for polysubstance abuse      Family History:     Family History   Problem Relation Age of Onset     Coronary Artery Disease Father      Alcoholism Brother      Alcoholism Brother      There is also family history for strokes, which reflected her grandmother and her aunt.  There is no history for epilepsy or other neurological disorders.       Home Medications:     Prior to Admission Medications   Prescriptions Last Dose Informant Patient Reported? Taking?   VITAMIN D3 50 MCG (2000 UT) tablet 9/7/2021 at Unknown time Self Yes Yes   Sig: Take 1 tablet by mouth daily   amLODIPine (NORVASC) 10 MG tablet 9/7/2021 at Unknown time Self Yes Yes   Sig: Take 10 mg by mouth daily    levETIRAcetam (KEPPRA) 500 MG tablet 9/6/2021 at Unknown time Self Yes Yes   Sig: Take 500 mg by mouth 2 times daily   magnesium oxide (MAG-OX) 400 MG tablet 9/7/2021 at Unknown time Self Yes Yes   Sig: Take 400 mg by mouth      Facility-Administered Medications: None          Allergy:   No Known Allergies       Inpatient Medications:   Scheduled Meds:    cloNIDine  0.1 mg Oral Q8H     folic acid  1 mg Oral Daily     levETIRAcetam  500 mg Oral BID     magnesium oxide  400 mg Oral Daily     multivitamin w/minerals  1 tablet Oral Daily     sodium chloride (PF)  3 mL Intracatheter Q8H     thiamine  100 mg Oral Daily     PRN Meds: diazepam **OR** diazepam, flumazenil, OLANZapine zydis **OR** haloperidol lactate, ibuprofen, lidocaine 4%, lidocaine (buffered or not buffered), melatonin, melatonin, ondansetron **OR** ondansetron, senna-docusate **OR** senna-docusate,  "sodium chloride (PF)        Review of Systems      A comprehensive review of  10 systems was performed and found to be negative except as described in this note  CONSTITUTIONAL: negative for fever, chills, change in weight  INTEGUMENTARY/SKIN: no rash or obvious new lesions  ENT/MOUTH: no sore throat, new sinus pain or nasal drainage, no neck mass noted  RESP: No pleuretic pain, No cough, no hemoptysis, No SOB   CV: negative for chest pain, palpitations or peripheral edema  GI: no nausea, vomiting, change in stools  : no dysuria or hematuria  MUSCULOSKELETAL: no myalgias, arthralgias or join efffusion  ENDOCRINE: no history of polyuria, polydyspsia or symptoms of thyroid dysfunction  PSYCHIATRIC: no change in mood stable  LYMPHATIC: no new lymphadenopathy  HEME: no bleeding or easy bruisability  NEURO: see HPI       Physical Exam:   Physical Exam   Vitals:  Height:5' 3\"  Weight:100 lbs 0 oz   Temp: 98.8  F (37.1  C) Temp src: Oral BP: 102/60 Pulse: 76   Resp: 18 SpO2: 98 % O2 Device: None (Room air)    General Appearance:  No acute distress  Neuro:       Mental Status Exam:    She was very alert and cooperative and made good made very good eye contact.  Speech was fluent, there was no dysarthria.  Repetition was good.  Fund fund of knowledge was appropriate.  She was oriented to time place and person and did follow simple and complex commands       Cranial Nerves:   The pupils were equal and briskly react to light and the fundi were not well visualized.  The palpebral fissure on the right was wider than the left and she had no slight drooping of the lid on the left.  However, the extraocular movements were full and there were no nystagmoid movements.  Visual fields were intact to confrontation.  There was a drooping of the right side of her mouth but the tongue was midline and she had symmetrical palatine movements and the rest of the cranial nerve examination was intact.           Motor: There was normal muscle " bulk and tone in all 4 extremities and there were no dystonic movements or tremors.  She had excellent strength, 5/5 in the upper and lower extremities, proximally and distally.          Reflexes: Deep tendon reflexes were 2+/4 in the biceps, triceps and patella on the left, on the right, reflexes were slightly brisker in the right biceps.  3/4, right patella 3/4, ankle responses were 2+ bilaterally, plantar responses were equivocal with withdrawal.       Sensory: Sensory examination was intact to primary and cortical sensations.                  Coordination:   There was no dysmetria to finger-to-nose or heel-to-shin test       Gait: The patient was somewhat lightheaded during the testing with stance and posture was normal her gait was hesitant and because of her symptoms, tandem walking and Romberg were not tested.  Neck: no nuchal rigidity, normal thyroid. No carotid bruits.    Cardiovascular: Regular rate and rhythm, no m/r/g  Lungs: Clear to auscultation  Abdomen: Soft, not tender, not distended  Extremities: No clubbing, no cyanosis, no edema       Data:   ROUTINE IP LABS   CBC RESULTS:     Recent Labs   Lab 09/07/21 1857   WBC 8.6   RBC 3.64*   HGB 12.2   HCT 34.1*   *     Basic Metabolic Panel:   Recent Labs   Lab Test 09/08/21  0948 09/08/21  0618 09/08/21  0115 09/07/21  1857 11/10/20  1515 08/31/20  1533   * 122* 121* 120* 129* 133*   POTASSIUM  --   --   --  3.1* 4.7 4.1   CHLORIDE  --   --   --  87* 91* 98   CO2  --   --   --  19* 23 23   BUN  --   --   --  3* 4* 6*   CR  --   --   --  0.42* 0.53* 0.55*   GLC  --   --   --  101* 78 81   CLAUDIA  --   --   --  9.2 9.6 9.6     Liver panel:  Recent Labs   Lab Test 09/07/21  1857 06/04/20  0625 06/02/20  1709 10/29/18  1503 06/06/18  1537 02/21/18  1555   PROTTOTAL 7.4 5.9* 7.7 6.7  --  7.5   ALBUMIN 4.0 3.3* 4.5 3.7  3.7 4.2 4.3   BILITOTAL 0.8 1.6* 1.6* 0.4  --  0.8   ALKPHOS 93 85 121* 79  --  82   AST 93* 51* 84* 34  --  48*   ALT 60* 41 78*  26  --  34     INR:  Recent Labs   Lab Test 06/02/20  1707   INR 0.92      Lipid Profile:  Recent Labs   Lab Test 06/04/20  0625 02/21/18  1555   CHOL  --  253*   TRIG 67  --      Thyroid Panel:  Recent Labs   Lab Test 06/03/20  0532 02/21/18  1555   TSH 2.12 2.17      Vitamin B12:   Recent Labs   Lab Test 04/05/18  0913   B12 >2,000*      Vitamin D level: No lab results found.  A1C: No lab results found.  Troponin I: No lab results found.  CRP inflammation: No lab results found.  ESR:   Recent Labs   Lab Test 02/21/18  1555   SED 5       JODRAN: No lab results found.    ANCA: No lab results found.      IMAGING:   All imaging studies were reviewed personally  CT head: 9/7/2021IMPRESSION:  1.  No acute intracranial process.  2.  Advanced atrophy for age    MRI brain: pending  EEG : Marked by movement artefacts, but no epileptiform discharges were seen  Labs reviewed  Results for BENJAMIN WEBB (MRN [id number]) as of 9/8/2021 13:01   Ref. Range 9/7/2021 18:57 9/7/2021 21:08   Sodium Latest Ref Range: 133 - 144 mmol/L 120 (L)    Potassium Latest Ref Range: 3.4 - 5.3 mmol/L 3.1 (L)    Chloride Latest Ref Range: 94 - 109 mmol/L 87 (L)    Carbon Dioxide Latest Ref Range: 20 - 32 mmol/L 19 (L)    Urea Nitrogen Latest Ref Range: 7 - 30 mg/dL 3 (L)    Creatinine Latest Ref Range: 0.52 - 1.04 mg/dL 0.42 (L)    GFR Estimate Latest Ref Range: >60 mL/min/1.73m2 >90    Calcium Latest Ref Range: 8.5 - 10.1 mg/dL 9.2    Anion Gap Latest Ref Range: 3 - 14 mmol/L 14    Magnesium Latest Ref Range: 1.6 - 2.3 mg/dL 1.7    Phosphorus Latest Ref Range: 2.5 - 4.5 mg/dL 2.3 (L)    Albumin Latest Ref Range: 3.4 - 5.0 g/dL 4.0    Protein Total Latest Ref Range: 6.8 - 8.8 g/dL 7.4    Bilirubin Total Latest Ref Range: 0.2 - 1.3 mg/dL 0.8    Alkaline Phosphatase Latest Ref Range: 40 - 150 U/L 93    ALT Latest Ref Range: 0 - 50 U/L 60 (H)    AST Latest Ref Range: 0 - 45 U/L 93 (H)    Bilirubin Direct Latest Ref Range: 0.0 - 0.2 mg/dL 0.3 (H)     Keppra level pending  Level 8/3/20 15.2    Time: 60minutes evaluation and management.   More than 50% of this time was spent in counseling and directing care.  The patient was advised regarding her right driving restrictions, she should not drive a motor vehicle for the next 6 months, in view of her recent generalized seizure with loss of consciousness, or until she is seen and evaluated by neurologist and is released from that restriction.  She should not work at heights or use dangerous machinery or swim alone.    Sherita roach M.D.  [facility]  Office [phone]

## 2021-09-09 VITALS
DIASTOLIC BLOOD PRESSURE: 73 MMHG | TEMPERATURE: 97.8 F | WEIGHT: 100 LBS | SYSTOLIC BLOOD PRESSURE: 128 MMHG | BODY MASS INDEX: 17.72 KG/M2 | HEART RATE: 85 BPM | OXYGEN SATURATION: 97 % | RESPIRATION RATE: 18 BRPM | HEIGHT: 63 IN

## 2021-09-09 LAB
ANION GAP SERPL CALCULATED.3IONS-SCNC: 14 MMOL/L (ref 3–14)
BUN SERPL-MCNC: 4 MG/DL (ref 7–30)
CALCIUM SERPL-MCNC: 9.1 MG/DL (ref 8.5–10.1)
CHLORIDE BLD-SCNC: 93 MMOL/L (ref 94–109)
CO2 SERPL-SCNC: 19 MMOL/L (ref 20–32)
CREAT SERPL-MCNC: 0.41 MG/DL (ref 0.52–1.04)
ERYTHROCYTE [DISTWIDTH] IN BLOOD BY AUTOMATED COUNT: 12.2 % (ref 10–15)
GFR SERPL CREATININE-BSD FRML MDRD: >90 ML/MIN/1.73M2
GLUCOSE BLD-MCNC: 74 MG/DL (ref 70–99)
HCT VFR BLD AUTO: 34.2 % (ref 35–47)
HGB BLD-MCNC: 11.7 G/DL (ref 11.7–15.7)
LEVETIRACETAM SERPL-MCNC: 15 UG/ML
LEVETIRACETAM SERPL-MCNC: 19 UG/ML
MCH RBC QN AUTO: 32.7 PG (ref 26.5–33)
MCHC RBC AUTO-ENTMCNC: 34.2 G/DL (ref 31.5–36.5)
MCV RBC AUTO: 96 FL (ref 78–100)
PLATELET # BLD AUTO: 112 10E3/UL (ref 150–450)
POTASSIUM BLD-SCNC: 3.3 MMOL/L (ref 3.4–5.3)
RBC # BLD AUTO: 3.58 10E6/UL (ref 3.8–5.2)
SODIUM SERPL-SCNC: 123 MMOL/L (ref 133–144)
SODIUM SERPL-SCNC: 125 MMOL/L (ref 133–144)
SODIUM SERPL-SCNC: 126 MMOL/L (ref 133–144)
SODIUM SERPL-SCNC: 126 MMOL/L (ref 133–144)
WBC # BLD AUTO: 6.9 10E3/UL (ref 4–11)

## 2021-09-09 PROCEDURE — 36415 COLL VENOUS BLD VENIPUNCTURE: CPT | Performed by: INTERNAL MEDICINE

## 2021-09-09 PROCEDURE — 84295 ASSAY OF SERUM SODIUM: CPT | Performed by: INTERNAL MEDICINE

## 2021-09-09 PROCEDURE — 999N000216 HC STATISTIC ADULT CD FACE TO FACE-NO CHRG

## 2021-09-09 PROCEDURE — 250N000013 HC RX MED GY IP 250 OP 250 PS 637: Performed by: INTERNAL MEDICINE

## 2021-09-09 PROCEDURE — 85027 COMPLETE CBC AUTOMATED: CPT | Performed by: INTERNAL MEDICINE

## 2021-09-09 PROCEDURE — 99239 HOSP IP/OBS DSCHRG MGMT >30: CPT | Performed by: INTERNAL MEDICINE

## 2021-09-09 RX ORDER — SODIUM CHLORIDE AND POTASSIUM CHLORIDE 150; 900 MG/100ML; MG/100ML
INJECTION, SOLUTION INTRAVENOUS CONTINUOUS
Status: DISCONTINUED | OUTPATIENT
Start: 2021-09-09 | End: 2021-09-09 | Stop reason: HOSPADM

## 2021-09-09 RX ADMIN — CLONIDINE HYDROCHLORIDE 0.1 MG: 0.1 TABLET ORAL at 08:14

## 2021-09-09 RX ADMIN — THIAMINE HCL TAB 100 MG 100 MG: 100 TAB at 08:14

## 2021-09-09 RX ADMIN — LEVETIRACETAM 500 MG: 500 TABLET, FILM COATED ORAL at 08:14

## 2021-09-09 RX ADMIN — CLONIDINE HYDROCHLORIDE 0.1 MG: 0.1 TABLET ORAL at 00:05

## 2021-09-09 RX ADMIN — MULTIPLE VITAMINS W/ MINERALS TAB 1 TABLET: TAB at 08:14

## 2021-09-09 RX ADMIN — FOLIC ACID 1 MG: 1 TABLET ORAL at 08:14

## 2021-09-09 RX ADMIN — MAGNESIUM OXIDE TAB 400 MG (241.3 MG ELEMENTAL MG) 400 MG: 400 (241.3 MG) TAB at 08:14

## 2021-09-09 ASSESSMENT — ACTIVITIES OF DAILY LIVING (ADL)
ADLS_ACUITY_SCORE: 19
ADLS_ACUITY_SCORE: 18
ADLS_ACUITY_SCORE: 19

## 2021-09-09 NOTE — CONSULTS
[facility], Consult & Liaison    Ivon Sheets  September 9, 2021      Psychiatry consult acknowledged.  attempted to meet with patient this morning. When  arrived patient could be heard yelling in her room demanding to leave, stating she has been held too long. MD and nurse provided patient AMA forms.  inquired if it would be helpful for  to meet with patient briefly before leaving, they declined. Patient left AMA.     Cyn King, [facility], [facility], Consult & Liaison Service  [phone]

## 2021-09-09 NOTE — PLAN OF CARE
Summary: Seizure at home yesterday. Hypokalemia. Alcohol abuse.   DATE & TIME: 09/08/21 7470-5836  Cognitive Concerns/ Orientation : A & O x4- forgetful. But does not remember seizure from yesterday.   BEHAVIOR & AGGRESSION TOOL COLOR:Green  CIWA SCORE: 5, 5 for anxiety  ABNL VS/O2: VSS on RA  MOBILITY: [employer]  PAIN MANAGMENT: Denies  DIET: Regular, poor oral intake Did not eat any dinner. Offers her food to visitors/staff.   BOWEL/BLADDER: No issues per pt report.   ABNL LAB/BG: Sodium 124, 123 (MD paged, see note) and K+ 3.4 (replaced, rechecked at 3.4).   DRAIN/DEVICES: PIV SL  TELEMETRY RHYTHM: N/A  SKIN: Pale/flushed. Many scattered bruising. Scab on lip.  TESTS/PROCEDURES: MRI ordered, pt currently refusing checklist printed and infront of chart.   D/C DAY/GOALS/PLACE: Pending  OTHER IMPORTANT INFO: Pt requesting to discharge, MD spoke with pt in day shift. Pt agreeable to stay until tomorrow. If she wants to leave it will have to be AMA. Sodium q4hrs.

## 2021-09-09 NOTE — DISCHARGE SUMMARY
[facility]  Discharge Summary        Ivon Sheets MRN# [id number]   YOB: 1970 Age: 51 year old     Date of Admission:  9/7/2021  Date of Discharge:  9/9/2021  Admitting Physician:  Melinda Allison MD  Discharge Physician: Melinda Allison MD  Discharging Service: Hospitalist     Primary Provider: Chris South  Primary Care Physician Phone Number: [phone]         Discharge Diagnoses/Problem Oriented Hospital Course (Providers):    Ivon Sheets was admitted on 9/7/2021 by Melinda Allison MD and I would refer you to their history and physical.  The following problems were addressed during her hospitalization:  Ivon Sheets is a 51 year old female who was admitted on 9/7/2021.  Assessment & Plan     Ivon Sheets is a 51 year old female with a history of alcohol use disorder, seizure  following alcohol withdrawal with associated fall and subarachnoid bleed presents to the emergency department following an episode of seizure at home.  Active Problems:  Alcohol use disorder    Nonintractable epilepsy without status epilepticus, unspecified epilepsy type (H)  Elevated LFTs  --- 6/2/2020 patient was admitted to [facility] for an episode of seizure (alcohol withdrawal seizure) with a fall resulting in traumatic hemorrhage of the right cerebrum with loss of consciousness.She had stable hemorrhagic parenchymal contusions in the right temporal lobe, frontal operculum and high bilateral parietal lobes.  Patient was discharged on Keppra, since then she followed up outpatient with neurology and Keppra was continued, patient did not quit drinking alcohol, she continues to drink 2-4 beers every day.  (Patient is not a good historian regarding amount of alcohol she drinks).  ---Current admission following an episode of witnessed tonic-clonic seizure at home (seen by roommate), her last drink was 24 hours ago, alcohol levels are negative here.  There is conflicting information  regarding her Keppra usage, she mentioned that she ran out of Keppra since yesterday and have not had her morning dose, I do not think she was started on Keppra for seizure disorder.  --- this possibly could be a alcohol withdrawal seizure, not sure the significance of patient not being consistent with Keppra dosage.  ---We will place on CIWA scale due to high risk for alcohol withdrawal.  --- Keppra levels are ordered in the ED, she was given 1 g of Keppra IV in the ER.  EEG done results are currently pending  Neurology consulted and are following appreciate assistance  No further recurrence of seizures since admission    Patient is adamant on leaving the hospital today.  Despite as risks discussing with her that she is at risk of seizures with alcohol withdrawal and hyponatremia.  Discussed with her about the risk of seizures and possible aspiration and even death with the patient and her mother  Despite knowing the risks and benefits of hospitalization patient left AGAINST MEDICAL ADVICE today     Acute hyponatremia on chronic hyponatremia  --- Multifactorial, possibly secondary to hypovolemic status, beer drinkers portal behzad.   Sodium still low at 123 today and patient left AGAINST MEDICAL ADVICE      Tobacco use disorder  ---We will order nicotine patch, 13-pack-year history of smoking noted .  --- In the past patient had taken Wellbutrin and Chantix with minimal improvement and side effects.     Hypertension  ---PTA on amlodipine, currently blood pressures are low we will hold off on it for now.  --- We will wait for medication reconciliation, it seems patient was also started on metoprolol in the past, she is not able to give me a good history.     Hypomagnesemia  Hypokalemia  ---PTA 1 magnesium 400 mg daily, will continue that  Vitamin D deficiency, PTA on vitamin D, will continue that.  Hypokalemia  ---40 mg of KCl ordered, protocol going forward.    Underweight:  Pt only weighs 100 pounds with BMI of 17  "     DVT Prophylaxis: Low Risk/Ambulatory with no VTE prophylaxis indicated  Code Status: Full Code     Disposition: Patient left AGAINST MEDICAL ADVICE      Melinda Allison MD   Page [phone](7AM-6PM)          Code Status:      Full Code        Brief Hospital Stay Summary Sent Home With Patient in AVS:               Important Results:      See below        Pending Results:        Unresulted Labs Ordered in the Past 30 Days of this Admission     Date and Time Order Name Status Description    9/8/2021  1:23 PM Keppra (Levetiracetam) Level In process     9/8/2021 10:22 AM Urine Culture Preliminary     9/7/2021  7:03 PM Keppra (Levetiracetam) Level In process             Discharge Instructions and Follow-Up:            Discharge Disposition:      Patient left AGAINST MEDICAL ADVICE        Discharge Medications:        Discharge Medication List as of 9/9/2021 10:50 AM      CONTINUE these medications which have NOT CHANGED    Details   amLODIPine (NORVASC) 10 MG tablet Take 10 mg by mouth daily , Historical      levETIRAcetam (KEPPRA) 500 MG tablet Take 500 mg by mouth 2 times daily, Historical      magnesium oxide (MAG-OX) 400 MG tablet Take 400 mg by mouth, Historical      VITAMIN D3 50 MCG (2000 UT) tablet Take 1 tablet by mouth daily, LUI SM, Historical               Allergies:       No Known Allergies        Consultations This Hospital Stay:      Consultation during this admission received from neurology        Condition and Physical on Discharge:      Discharge condition: Stable   Vitals: Blood pressure 128/73, pulse 85, temperature 97.8  F (36.6  C), temperature source Oral, resp. rate 18, height 1.6 m (5' 3\"), weight 45.4 kg (100 lb), SpO2 97 %, not currently breastfeeding.     Constitutional:  Alert awake, not in acute distress , ambulating the hallways   Lungs:  Clear to auscultation   Cardiovascular:  Normal rate rhythm regular   Abdomen:  Soft, nontender, nondistended, bowel sounds positive   Skin:  Warm and " dry   Other:          Discharge Time:      Greater than 30 minutes.        Image Results From This Hospital Stay (For Non-EPIC Providers):        Results for orders placed or performed during the hospital encounter of 09/07/21   Head CT w/o contrast    Narrative    EXAM: CT HEAD W/O CONTRAST  LOCATION: [facility]  DATE/TIME: 9/7/2021 11:24 PM    INDICATION: Seizure, nontraumatic (Age >= 41y)  COMPARISON: 7/24/2020  TECHNIQUE: Routine CT Head without IV contrast. Multiplanar reformats. Dose reduction techniques were used.    FINDINGS:  INTRACRANIAL CONTENTS: No intracranial hemorrhage, extraaxial collection, or mass effect.  No CT evidence of acute infarct. Normal parenchymal attenuation. Prominent atrophy for age.     VISUALIZED ORBITS/SINUSES/MASTOIDS: No intraorbital abnormality. No paranasal sinus mucosal disease. No middle ear or mastoid effusion.    BONES/SOFT TISSUES: No acute abnormality.      Impression    IMPRESSION:  1.  No acute intracranial process.  2.  Advanced atrophy for age.           Most Recent Lab Results In EPIC (For Non-EPIC Providers):    Most Recent 3 CBC's:  Recent Labs   Lab Test 09/09/21  0735 09/07/21  1857 08/31/20  1533 06/03/20  0532   WBC 6.9 8.6  --  6.1   HGB 11.7 12.2 12.9 10.3*   MCV 96 94  --  91   * 107*  --  113*      Most Recent 3 BMP's:  Recent Labs   Lab Test 09/09/21  0735 09/09/21  0415 09/08/21  2345 09/08/21  2033 09/08/21  1607 09/08/21  0115 09/07/21  1857 11/10/20  1515   *  126* 123* 125* 123* 124*  --  120* 129*   POTASSIUM 3.3*  --   --  3.4 3.4   < > 3.1* 4.7   CHLORIDE 93*  --   --   --   --   --  87* 91*   CO2 19*  --   --   --   --   --  19* 23   BUN 4*  --   --   --   --   --  3* 4*   CR 0.41*  --   --   --   --   --  0.42* 0.53*   ANIONGAP 14  --   --   --   --   --  14 15   CLAUDIA 9.1  --   --   --   --   --  9.2 9.6   GLC 74  --   --   --   --   --  101* 78    < > = values in this interval not displayed.     Most Recent  3 Troponin's:No lab results found.    Invalid input(s): TROP, TROPONINIES  Most Recent 3 INR's:  Recent Labs   Lab Test 06/02/20  1707   INR 0.92     Most Recent 2 LFT's:  Recent Labs   Lab Test 09/07/21  1857 06/04/20  0625   AST 93* 51*   ALT 60* 41   ALKPHOS 93 85   BILITOTAL 0.8 1.6*     Most Recent Cholesterol Panel:  Recent Labs   Lab Test 06/04/20  0625 02/21/18  1555   CHOL  --  253*   TRIG 67  --      Most Recent 6 Bacteria Isolates From Any Culture (See EPIC Reports for Culture Details):No lab results found.  Most Recent TSH, T4 and HgbA1c:   Recent Labs   Lab Test 06/03/20  0532   TSH 2.12

## 2021-09-09 NOTE — PROVIDER NOTIFICATION
MD Notification    Notified Person: MD    Notified Person Name: Estefany    Notification Date/Time: 9/8 1640    Notification Interaction: Q-go messaging    Purpose of Notification: Pt's Na just came back 124, down from 126- should we increase fluids?    Orders Received:    Comments:

## 2021-09-09 NOTE — PLAN OF CARE
Summary: Seizure, Hypokalemia, ETOH   DATE & TIME: 9/8/21-9/9/21 [id number]  Cognitive Concerns/ Orientation : A&Ox3-4- forgetful and disoriented to situation at times  BEHAVIOR & AGGRESSION TOOL COLOR:Green  CIWA SCORE: 3, 6 (anxiety, and agitation)   ABNL VS/O2: VSS on RA ex slight HTN, refused second set of vital signs.   MOBILITY: SBA, refuses bed alarm, steady on feet, frequent rounding  PAIN MANAGMENT: Denies  DIET: Regular  BOWEL/BLADDER: Continent  ABNL LAB/BG: Na (q4h checks): 125, 123  DRAIN/DEVICES: PIV SL  TELEMETRY RHYTHM: N/A  SKIN: Pale/flushed. Many scattered bruising. Scab on lip.  TESTS/PROCEDURES: MRI ordered, pt currently refusing checklist printed and infront of chart.   D/C DAY/GOALS/PLACE: Pending  OTHER IMPORTANT INFO: Pt requesting to discharge, pt agreeable to stay until MD sees her this AM. Pt is scheduled for a 0930 appointment at the [facility], may need help rescheduling.  If she wants to leave it will have to be AMA. Sodium q4hrs.

## 2021-09-09 NOTE — PROGRESS NOTES
Patient left the hospital without signing the AMA, and took AMA paper with her. Charge nurse is aware and notified MD.

## 2021-09-09 NOTE — PROVIDER NOTIFICATION
MD Notification    Notified Person: MD    Notified Person Name: Pebbles (on call)    Notification Date/Time: 9/8/21 2110    Notification Interaction: page    Purpose of Notification:  Pt's Na down to 123 from 124 (earlier today 126). On D5 1/2 NS w/ K+ at 50mL/hr. Should we change fluids?    Orders Received: Stop the fluids, recheck Na as scheduled    Comments:

## 2021-09-09 NOTE — CONSULTS
9/9/2021       Spoke with pt via telephone to screen for CD services. Pt reports she is not interested in attending inpatient CD treatment at this time. Pt reports she would be willing to receive CD resources. Pt has requested resources be mailed to her. Resources have been sent to [facility] to be mailed to pt. Pt is able to call Mental Health and Addiction Services Line: [phone] and make an appt through [facility] after she is discharged if she would like an evaluation.     Gayle Castellano [facility]  Phone: [phone]  Email: [email or web address]

## 2021-09-10 LAB — BACTERIA UR CULT: ABNORMAL

## 2021-09-10 NOTE — PLAN OF CARE
Physical Therapy Discharge Summary    Reason for therapy discharge:    Discharged to unknown. Pt left AMA.     Progress towards therapy goal(s). See goals on Care Plan in [facility] electronic health record for goal details.  Goals not met.  Barriers to achieving goals:   pt only seen for initial evaluation and treatment.    Therapy recommendation(s):    No further therapy is recommended. **Pt not seen by discharging therapist on this date, note written based on previous treating therapist's notes and recommendations.

## 2021-09-11 ENCOUNTER — HEALTH MAINTENANCE LETTER (OUTPATIENT)
Age: 51
End: 2021-09-11

## 2021-10-27 LAB
ALT SERPL-CCNC: 31 U/L (ref 0–55)
AST SERPL-CCNC: 75 U/L (ref 10–40)
CHOLESTEROL (EXTERNAL): 296 MG/DL (ref 0–199)
CREATININE (EXTERNAL): 0.39 MG/DL (ref 0.55–1.02)
GFR ESTIMATED (EXTERNAL): >60 ML/MIN/1.73M2
GLUCOSE (EXTERNAL): 85 MG/DL (ref 70–100)
HDLC SERPL-MCNC: >145 MG/DL
HEP C HIM: NORMAL
HIV 1&2 EXT: NORMAL
POTASSIUM (EXTERNAL): 4.8 MMOL/L (ref 3.5–5.1)
TRIGLYCERIDES (EXTERNAL): 59 MG/DL
TSH SERPL-ACNC: 2.57 ULU/ML (ref 0.3–4.5)

## 2022-02-18 ENCOUNTER — TRANSFERRED RECORDS (OUTPATIENT)
Dept: HEALTH INFORMATION MANAGEMENT | Facility: CLINIC | Age: 52
End: 2022-02-18
Payer: COMMERCIAL

## 2022-02-18 LAB
CHOLESTEROL (EXTERNAL): 186 MG/DL (ref 0–199)
CREATININE (EXTERNAL): 0.43 MG/DL (ref 0.55–1.02)
GFR ESTIMATED (EXTERNAL): >60 ML/MIN/1.73M2
GLUCOSE (EXTERNAL): 87 MG/DL (ref 70–100)
HDLC SERPL-MCNC: 102 MG/DL
LDL CHOLESTEROL CALCULATED (EXTERNAL): 74 MG/DL
NON HDL CHOLESTEROL (EXTERNAL): 84 MG/DL
POTASSIUM (EXTERNAL): 4.5 MMOL/L (ref 3.5–5.1)
TRIGLYCERIDES (EXTERNAL): 51 MG/DL

## 2022-02-22 RX ORDER — MUPIROCIN 20 MG/G
OINTMENT TOPICAL 3 TIMES DAILY
COMMUNITY

## 2022-02-22 RX ORDER — IBUPROFEN 200 MG
200-400 TABLET ORAL EVERY 4 HOURS PRN
COMMUNITY

## 2022-02-22 RX ORDER — SODIUM CHLORIDE 1 G/1
1 TABLET ORAL DAILY
COMMUNITY

## 2022-02-23 DIAGNOSIS — Z11.59 ENCOUNTER FOR SCREENING FOR OTHER VIRAL DISEASES: Primary | ICD-10-CM

## 2022-02-24 ENCOUNTER — TRANSFERRED RECORDS (OUTPATIENT)
Dept: MULTI SPECIALTY CLINIC | Facility: CLINIC | Age: 52
End: 2022-02-24
Payer: COMMERCIAL

## 2022-02-24 LAB
CREATININE (EXTERNAL): 0.36 MG/DL (ref 0.55–1.02)
GFR ESTIMATED (EXTERNAL): >60 ML/MIN/1.73M2
GLUCOSE (EXTERNAL): 93 MG/DL (ref 70–100)
POTASSIUM (EXTERNAL): 4.3 MMOL/L (ref 3.5–5.1)

## 2022-02-25 ENCOUNTER — ANESTHESIA EVENT (OUTPATIENT)
Dept: SURGERY | Facility: CLINIC | Age: 52
End: 2022-02-25
Payer: COMMERCIAL

## 2022-02-25 ENCOUNTER — HOSPITAL ENCOUNTER (OUTPATIENT)
Facility: CLINIC | Age: 52
Discharge: HOME | End: 2022-02-25
Attending: ORTHOPAEDIC SURGERY | Admitting: ORTHOPAEDIC SURGERY
Payer: COMMERCIAL

## 2022-02-25 ENCOUNTER — DOCUMENTATION ONLY (OUTPATIENT)
Dept: SURGERY | Facility: CLINIC | Age: 52
End: 2022-02-25

## 2022-02-25 ENCOUNTER — APPOINTMENT (OUTPATIENT)
Dept: GENERAL RADIOLOGY | Facility: CLINIC | Age: 52
End: 2022-02-25
Attending: ORTHOPAEDIC SURGERY
Payer: COMMERCIAL

## 2022-02-25 ENCOUNTER — ANESTHESIA (OUTPATIENT)
Dept: SURGERY | Facility: CLINIC | Age: 52
End: 2022-02-25
Payer: COMMERCIAL

## 2022-02-25 VITALS
HEART RATE: 118 BPM | HEIGHT: 63 IN | BODY MASS INDEX: 19.53 KG/M2 | TEMPERATURE: 97.8 F | DIASTOLIC BLOOD PRESSURE: 84 MMHG | SYSTOLIC BLOOD PRESSURE: 118 MMHG | OXYGEN SATURATION: 93 % | RESPIRATION RATE: 16 BRPM | WEIGHT: 110.2 LBS

## 2022-02-25 DIAGNOSIS — S42.202A CLOSED FRACTURE OF PROXIMAL END OF LEFT HUMERUS, UNSPECIFIED FRACTURE MORPHOLOGY, INITIAL ENCOUNTER: Primary | ICD-10-CM

## 2022-02-25 LAB — SARS-COV-2 RNA RESP QL NAA+PROBE: POSITIVE

## 2022-02-25 PROCEDURE — 250N000009 HC RX 250: Performed by: NURSE ANESTHETIST, CERTIFIED REGISTERED

## 2022-02-25 PROCEDURE — 258N000003 HC RX IP 258 OP 636: Performed by: ANESTHESIOLOGY

## 2022-02-25 PROCEDURE — 272N000001 HC OR GENERAL SUPPLY STERILE: Performed by: ORTHOPAEDIC SURGERY

## 2022-02-25 PROCEDURE — 999N000141 HC STATISTIC PRE-PROCEDURE NURSING ASSESSMENT: Performed by: ORTHOPAEDIC SURGERY

## 2022-02-25 PROCEDURE — 258N000003 HC RX IP 258 OP 636: Performed by: NURSE ANESTHETIST, CERTIFIED REGISTERED

## 2022-02-25 PROCEDURE — L3670 SO ACRO/CLAV CAN WEB PRE OTS: HCPCS

## 2022-02-25 PROCEDURE — 250N000011 HC RX IP 250 OP 636: Performed by: PHYSICIAN ASSISTANT

## 2022-02-25 PROCEDURE — 250N000025 HC SEVOFLURANE, PER MIN: Performed by: ORTHOPAEDIC SURGERY

## 2022-02-25 PROCEDURE — 250N000011 HC RX IP 250 OP 636: Performed by: NURSE ANESTHETIST, CERTIFIED REGISTERED

## 2022-02-25 PROCEDURE — C1713 ANCHOR/SCREW BN/BN,TIS/BN: HCPCS | Performed by: ORTHOPAEDIC SURGERY

## 2022-02-25 PROCEDURE — 370N000017 HC ANESTHESIA TECHNICAL FEE, PER MIN: Performed by: ORTHOPAEDIC SURGERY

## 2022-02-25 PROCEDURE — 710N000012 HC RECOVERY PHASE 2, PER MINUTE: Performed by: ORTHOPAEDIC SURGERY

## 2022-02-25 PROCEDURE — 250N000013 HC RX MED GY IP 250 OP 250 PS 637: Performed by: ANESTHESIOLOGY

## 2022-02-25 PROCEDURE — C1762 CONN TISS, HUMAN(INC FASCIA): HCPCS | Performed by: ORTHOPAEDIC SURGERY

## 2022-02-25 PROCEDURE — 87635 SARS-COV-2 COVID-19 AMP PRB: CPT | Performed by: ANESTHESIOLOGY

## 2022-02-25 PROCEDURE — 999N000179 XR SURGERY CARM FLUORO LESS THAN 5 MIN W STILLS

## 2022-02-25 PROCEDURE — 250N000009 HC RX 250: Performed by: ORTHOPAEDIC SURGERY

## 2022-02-25 PROCEDURE — 710N000009 HC RECOVERY PHASE 1, LEVEL 1, PER MIN: Performed by: ORTHOPAEDIC SURGERY

## 2022-02-25 PROCEDURE — 360N000084 HC SURGERY LEVEL 4 W/ FLUORO, PER MIN: Performed by: ORTHOPAEDIC SURGERY

## 2022-02-25 PROCEDURE — 250N000009 HC RX 250: Performed by: ANESTHESIOLOGY

## 2022-02-25 DEVICE — IMPLANTABLE DEVICE: Type: IMPLANTABLE DEVICE | Site: SHOULDER | Status: FUNCTIONAL

## 2022-02-25 DEVICE — GRAFT BONE PUTTY DBX 05ML 038050: Type: IMPLANTABLE DEVICE | Site: SHOULDER | Status: FUNCTIONAL

## 2022-02-25 RX ORDER — ONDANSETRON 4 MG/1
4 TABLET, ORALLY DISINTEGRATING ORAL EVERY 30 MIN PRN
Status: DISCONTINUED | OUTPATIENT
Start: 2022-02-25 | End: 2022-02-25 | Stop reason: HOSPADM

## 2022-02-25 RX ORDER — PROPOFOL 10 MG/ML
INJECTION, EMULSION INTRAVENOUS PRN
Status: DISCONTINUED | OUTPATIENT
Start: 2022-02-25 | End: 2022-02-25

## 2022-02-25 RX ORDER — HYDROCODONE BITARTRATE AND ACETAMINOPHEN 5; 325 MG/1; MG/1
1 TABLET ORAL EVERY 6 HOURS PRN
COMMUNITY

## 2022-02-25 RX ORDER — CEFAZOLIN SODIUM/WATER 2 G/20 ML
2 SYRINGE (ML) INTRAVENOUS
Status: COMPLETED | OUTPATIENT
Start: 2022-02-25 | End: 2022-02-25

## 2022-02-25 RX ORDER — EPHEDRINE SULFATE 50 MG/ML
INJECTION, SOLUTION INTRAMUSCULAR; INTRAVENOUS; SUBCUTANEOUS PRN
Status: DISCONTINUED | OUTPATIENT
Start: 2022-02-25 | End: 2022-02-25

## 2022-02-25 RX ORDER — LABETALOL HYDROCHLORIDE 5 MG/ML
10 INJECTION, SOLUTION INTRAVENOUS
Status: DISCONTINUED | OUTPATIENT
Start: 2022-02-25 | End: 2022-02-25 | Stop reason: HOSPADM

## 2022-02-25 RX ORDER — FENTANYL CITRATE 50 UG/ML
25 INJECTION, SOLUTION INTRAMUSCULAR; INTRAVENOUS EVERY 5 MIN PRN
Status: DISCONTINUED | OUTPATIENT
Start: 2022-02-25 | End: 2022-02-25 | Stop reason: HOSPADM

## 2022-02-25 RX ORDER — ONDANSETRON 2 MG/ML
4 INJECTION INTRAMUSCULAR; INTRAVENOUS EVERY 30 MIN PRN
Status: DISCONTINUED | OUTPATIENT
Start: 2022-02-25 | End: 2022-02-25 | Stop reason: HOSPADM

## 2022-02-25 RX ORDER — ONDANSETRON 2 MG/ML
INJECTION INTRAMUSCULAR; INTRAVENOUS PRN
Status: DISCONTINUED | OUTPATIENT
Start: 2022-02-25 | End: 2022-02-25

## 2022-02-25 RX ORDER — LIDOCAINE HYDROCHLORIDE 20 MG/ML
INJECTION, SOLUTION INFILTRATION; PERINEURAL PRN
Status: DISCONTINUED | OUTPATIENT
Start: 2022-02-25 | End: 2022-02-25

## 2022-02-25 RX ORDER — TRAMADOL HYDROCHLORIDE 50 MG/1
50 TABLET ORAL EVERY 6 HOURS PRN
Qty: 20 TABLET | Refills: 0 | Status: SHIPPED | OUTPATIENT
Start: 2022-02-25

## 2022-02-25 RX ORDER — ACETAMINOPHEN 325 MG/1
650 TABLET ORAL
Status: DISCONTINUED | OUTPATIENT
Start: 2022-02-25 | End: 2022-02-25 | Stop reason: HOSPADM

## 2022-02-25 RX ORDER — DEXAMETHASONE SODIUM PHOSPHATE 4 MG/ML
INJECTION, SOLUTION INTRA-ARTICULAR; INTRALESIONAL; INTRAMUSCULAR; INTRAVENOUS; SOFT TISSUE PRN
Status: DISCONTINUED | OUTPATIENT
Start: 2022-02-25 | End: 2022-02-25

## 2022-02-25 RX ORDER — MEPERIDINE HYDROCHLORIDE 25 MG/ML
12.5 INJECTION INTRAMUSCULAR; INTRAVENOUS; SUBCUTANEOUS
Status: DISCONTINUED | OUTPATIENT
Start: 2022-02-25 | End: 2022-02-25 | Stop reason: HOSPADM

## 2022-02-25 RX ORDER — ACETAMINOPHEN 325 MG/1
650 TABLET ORAL EVERY 4 HOURS PRN
Qty: 50 TABLET | Refills: 0 | Status: SHIPPED | OUTPATIENT
Start: 2022-02-25

## 2022-02-25 RX ORDER — SODIUM CHLORIDE, SODIUM LACTATE, POTASSIUM CHLORIDE, CALCIUM CHLORIDE 600; 310; 30; 20 MG/100ML; MG/100ML; MG/100ML; MG/100ML
INJECTION, SOLUTION INTRAVENOUS CONTINUOUS
Status: DISCONTINUED | OUTPATIENT
Start: 2022-02-25 | End: 2022-02-25 | Stop reason: HOSPADM

## 2022-02-25 RX ORDER — MAGNESIUM HYDROXIDE 1200 MG/15ML
LIQUID ORAL PRN
Status: DISCONTINUED | OUTPATIENT
Start: 2022-02-25 | End: 2022-02-25 | Stop reason: HOSPADM

## 2022-02-25 RX ORDER — NEOSTIGMINE METHYLSULFATE 1 MG/ML
VIAL (ML) INJECTION PRN
Status: DISCONTINUED | OUTPATIENT
Start: 2022-02-25 | End: 2022-02-25

## 2022-02-25 RX ORDER — BUPIVACAINE HYDROCHLORIDE AND EPINEPHRINE 2.5; 5 MG/ML; UG/ML
INJECTION, SOLUTION EPIDURAL; INFILTRATION; INTRACAUDAL; PERINEURAL PRN
Status: DISCONTINUED | OUTPATIENT
Start: 2022-02-25 | End: 2022-02-25 | Stop reason: HOSPADM

## 2022-02-25 RX ORDER — OXYCODONE HYDROCHLORIDE 5 MG/1
5 TABLET ORAL EVERY 4 HOURS PRN
Status: DISCONTINUED | OUTPATIENT
Start: 2022-02-25 | End: 2022-02-25 | Stop reason: HOSPADM

## 2022-02-25 RX ORDER — GLYCOPYRROLATE 0.2 MG/ML
INJECTION, SOLUTION INTRAMUSCULAR; INTRAVENOUS PRN
Status: DISCONTINUED | OUTPATIENT
Start: 2022-02-25 | End: 2022-02-25

## 2022-02-25 RX ORDER — PROPOFOL 10 MG/ML
INJECTION, EMULSION INTRAVENOUS CONTINUOUS PRN
Status: DISCONTINUED | OUTPATIENT
Start: 2022-02-25 | End: 2022-02-25

## 2022-02-25 RX ORDER — CEFAZOLIN SODIUM/WATER 2 G/20 ML
2 SYRINGE (ML) INTRAVENOUS SEE ADMIN INSTRUCTIONS
Status: DISCONTINUED | OUTPATIENT
Start: 2022-02-25 | End: 2022-02-25 | Stop reason: HOSPADM

## 2022-02-25 RX ORDER — FENTANYL CITRATE 50 UG/ML
INJECTION, SOLUTION INTRAMUSCULAR; INTRAVENOUS PRN
Status: DISCONTINUED | OUTPATIENT
Start: 2022-02-25 | End: 2022-02-25

## 2022-02-25 RX ORDER — FENTANYL CITRATE 0.05 MG/ML
25 INJECTION, SOLUTION INTRAMUSCULAR; INTRAVENOUS
Status: CANCELLED | OUTPATIENT
Start: 2022-02-25

## 2022-02-25 RX ORDER — HYDROXYZINE HYDROCHLORIDE 25 MG/1
25 TABLET, FILM COATED ORAL 3 TIMES DAILY PRN
Qty: 20 TABLET | Refills: 0 | Status: SHIPPED | OUTPATIENT
Start: 2022-02-25

## 2022-02-25 RX ORDER — HYDROMORPHONE HCL IN WATER/PF 6 MG/30 ML
0.2 PATIENT CONTROLLED ANALGESIA SYRINGE INTRAVENOUS EVERY 5 MIN PRN
Status: DISCONTINUED | OUTPATIENT
Start: 2022-02-25 | End: 2022-02-25 | Stop reason: HOSPADM

## 2022-02-25 RX ADMIN — LIDOCAINE HYDROCHLORIDE 50 MG: 20 INJECTION, SOLUTION INFILTRATION; PERINEURAL at 12:17

## 2022-02-25 RX ADMIN — PROPOFOL 120 MG: 10 INJECTION, EMULSION INTRAVENOUS at 12:17

## 2022-02-25 RX ADMIN — PROPOFOL 30 MCG/KG/MIN: 10 INJECTION, EMULSION INTRAVENOUS at 12:26

## 2022-02-25 RX ADMIN — FENTANYL CITRATE 50 MCG: 50 INJECTION, SOLUTION INTRAMUSCULAR; INTRAVENOUS at 12:13

## 2022-02-25 RX ADMIN — SODIUM CHLORIDE, POTASSIUM CHLORIDE, SODIUM LACTATE AND CALCIUM CHLORIDE: 600; 310; 30; 20 INJECTION, SOLUTION INTRAVENOUS at 11:51

## 2022-02-25 RX ADMIN — FENTANYL CITRATE 50 MCG: 50 INJECTION, SOLUTION INTRAMUSCULAR; INTRAVENOUS at 12:24

## 2022-02-25 RX ADMIN — PHENYLEPHRINE HYDROCHLORIDE 100 MCG: 10 INJECTION INTRAVENOUS at 13:08

## 2022-02-25 RX ADMIN — PHENYLEPHRINE HYDROCHLORIDE 100 MCG: 10 INJECTION INTRAVENOUS at 12:32

## 2022-02-25 RX ADMIN — ONDANSETRON 4 MG: 2 INJECTION INTRAMUSCULAR; INTRAVENOUS at 14:22

## 2022-02-25 RX ADMIN — Medication 2 G: at 12:05

## 2022-02-25 RX ADMIN — PHENYLEPHRINE HYDROCHLORIDE 100 MCG: 10 INJECTION INTRAVENOUS at 12:35

## 2022-02-25 RX ADMIN — MIDAZOLAM 2 MG: 1 INJECTION INTRAMUSCULAR; INTRAVENOUS at 12:08

## 2022-02-25 RX ADMIN — Medication 5 MG: at 12:50

## 2022-02-25 RX ADMIN — Medication 5 MG: at 13:38

## 2022-02-25 RX ADMIN — Medication 5 MG: at 12:42

## 2022-02-25 RX ADMIN — ROPIVACAINE HYDROCHLORIDE 20 ML: 2 INJECTION, SOLUTION EPIDURAL; INFILTRATION at 12:15

## 2022-02-25 RX ADMIN — Medication 2.5 MG: at 14:17

## 2022-02-25 RX ADMIN — ROCURONIUM BROMIDE 20 MG: 50 INJECTION, SOLUTION INTRAVENOUS at 13:23

## 2022-02-25 RX ADMIN — PHENYLEPHRINE HYDROCHLORIDE 100 MCG: 10 INJECTION INTRAVENOUS at 14:06

## 2022-02-25 RX ADMIN — OXYCODONE HYDROCHLORIDE 5 MG: 5 TABLET ORAL at 15:36

## 2022-02-25 RX ADMIN — PHENYLEPHRINE HYDROCHLORIDE 100 MCG: 10 INJECTION INTRAVENOUS at 13:33

## 2022-02-25 RX ADMIN — PHENYLEPHRINE HYDROCHLORIDE 100 MCG: 10 INJECTION INTRAVENOUS at 12:50

## 2022-02-25 RX ADMIN — PHENYLEPHRINE HYDROCHLORIDE 100 MCG: 10 INJECTION INTRAVENOUS at 13:38

## 2022-02-25 RX ADMIN — NEOSTIGMINE METHYLSULFATE 2.5 MG: 1 INJECTION, SOLUTION INTRAVENOUS at 14:42

## 2022-02-25 RX ADMIN — Medication 5 MG: at 13:51

## 2022-02-25 RX ADMIN — SODIUM CHLORIDE, POTASSIUM CHLORIDE, SODIUM LACTATE AND CALCIUM CHLORIDE: 600; 310; 30; 20 INJECTION, SOLUTION INTRAVENOUS at 13:49

## 2022-02-25 RX ADMIN — PHENYLEPHRINE HYDROCHLORIDE 100 MCG: 10 INJECTION INTRAVENOUS at 12:42

## 2022-02-25 RX ADMIN — PHENYLEPHRINE HYDROCHLORIDE 50 MCG: 10 INJECTION INTRAVENOUS at 13:26

## 2022-02-25 RX ADMIN — PHENYLEPHRINE HYDROCHLORIDE 0.3 MCG/KG/MIN: 10 INJECTION INTRAVENOUS at 13:21

## 2022-02-25 RX ADMIN — GLYCOPYRROLATE 0.3 MG: 0.2 INJECTION, SOLUTION INTRAMUSCULAR; INTRAVENOUS at 14:42

## 2022-02-25 RX ADMIN — PHENYLEPHRINE HYDROCHLORIDE 100 MCG: 10 INJECTION INTRAVENOUS at 13:51

## 2022-02-25 RX ADMIN — ROCURONIUM BROMIDE 30 MG: 50 INJECTION, SOLUTION INTRAVENOUS at 12:17

## 2022-02-25 RX ADMIN — Medication 5 MG: at 14:06

## 2022-02-25 RX ADMIN — DEXAMETHASONE SODIUM PHOSPHATE 4 MG: 4 INJECTION, SOLUTION INTRA-ARTICULAR; INTRALESIONAL; INTRAMUSCULAR; INTRAVENOUS; SOFT TISSUE at 13:14

## 2022-02-25 RX ADMIN — PHENYLEPHRINE HYDROCHLORIDE 100 MCG: 10 INJECTION INTRAVENOUS at 14:17

## 2022-02-25 ASSESSMENT — ENCOUNTER SYMPTOMS: SEIZURES: 1

## 2022-02-25 ASSESSMENT — LIFESTYLE VARIABLES: TOBACCO_USE: 1

## 2022-02-25 NOTE — ANESTHESIA CARE TRANSFER NOTE
Patient: Ivon Sheets    Procedure: Procedure(s):  OPEN REDUCTION INTERNAL FIXATION LEFT PROXIMAL HUMERUS FRACTURE       Diagnosis: Closed fracture of left proximal humerus [S42.202A]  Diagnosis Additional Information: No value filed.    Anesthesia Type:   General     Note:    Oropharynx: oropharynx clear of all foreign objects  Level of Consciousness: awake  Oxygen Supplementation: face mask  Level of Supplemental Oxygen (L/min / FiO2): 6  Independent Airway: airway patency satisfactory and stable  Dentition: dentition unchanged  Vital Signs Stable: post-procedure vital signs reviewed and stable  Report to RN Given: handoff report given  Destination: Patient recovering in OR.          Vitals:  Vitals Value Taken Time   /78    Temp 36.6    Pulse 87    Resp 14    SpO2 100        Electronically Signed By: JOHN Farnsworth CRNA  February 25, 2022  3:03 PM

## 2022-02-25 NOTE — PROGRESS NOTES
S. We received an order for an left shoulder Ultrasling III at the main OR.    O/goal. To reduce motion and help with post-op support    A. At this time, I have provided the main OR with a size medium Cristofer Mills Ultrasling III.    P. Staffing have been instructed to contact our facility with any future questions and/or concerns.     Ryan DONALD

## 2022-02-25 NOTE — ANESTHESIA PROCEDURE NOTES
Airway       Patient location during procedure: OR       Procedure Start/Stop Times: 2/25/2022 12:20 PM  Staff -        Anesthesiologist:  Dillon Dahl MD       CRNA: Birdie Lawson APRN CRNA       Other Anesthesia Staff: Vanita Noe       Performed By: SRNA  Consent for Airway        Urgency: elective  Indications and Patient Condition       Indications for airway management: chika-procedural         Mask difficulty assessment: 1 - vent by mask    Final Airway Details       Final airway type: endotracheal airway       Successful airway: ETT - single  Endotracheal Airway Details        ETT size (mm): 7.0       Cuffed: yes       Successful intubation technique: video laryngoscopy       VL Blade Size: Glidescope 3       Grade View of Cords: 1       Adjucts: stylet       Position: Right       Measured from: lips       Secured at (cm): 22    Post intubation assessment        Placement verified by: capnometry, equal breath sounds and chest rise        Number of attempts at approach: 1       Secured with: pink tape       Ease of procedure: easy       Dentition: Intact and Unchanged

## 2022-02-25 NOTE — DISCHARGE INSTRUCTIONS
You were given one 5 mg oxycodone pain pill at 3:35 PM      Same Day Surgery Discharge Instructions for  Sedation and General Anesthesia       It's not unusual to feel dizzy, light-headed or faint for up to 24 hours after surgery or while taking pain medication.  If you have these symptoms: sit for a few minutes before standing and have someone assist you when you get up to walk or use the bathroom.      You should rest and relax for the next 24 hours. We recommend you make arrangements to have an adult stay with you for at least 24 hours after your discharge.  Avoid hazardous and strenuous activity.      DO NOT DRIVE any vehicle or operate mechanical equipment for 24 hours following the end of your surgery.  Even though you may feel normal, your reactions may be affected by the medication you have received.      Do not drink alcoholic beverages for 24 hours following surgery.       Slowly progress to your regular diet as you feel able. It's not unusual to feel nauseated and/or vomit after receiving anesthesia.  If you develop these symptoms, drink clear liquids (apple juice, ginger ale, broth, 7-up, etc. ) until you feel better.  If your nausea and vomiting persists for 24 hours, please notify your surgeon.        All narcotic pain medications, along with inactivity and anesthesia, can cause constipation. Drinking plenty of liquids and increasing fiber intake will help.      For any questions of a medical nature, call your surgeon.      Do not make important decisions for 24 hours.      If you had general anesthesia, you may have a sore throat for a couple of days related to the breathing tube used during surgery.  You may use Cepacol lozenges to help with this discomfort.  If it worsens or if you develop a fever, contact your surgeon.       If you feel your pain is not well managed with the pain medications prescribed by your surgeon, please contact your surgeon's office to let them know so they can address your  concerns.       CoVid 19 Information    We want to give you information regarding Covid. Please consult your primary care provider with any questions you might have.     Patient who have symptoms (cough, fever, or shortness of breath), need to isolate for 7 days from when symptoms started OR 72 hours after fever resolves (without fever reducing medications) AND improvement of respiratory symptoms (whichever is longer).      Isolate yourself at home (in own room/own bathroom if possible)    Do Not allow any visitors    Do Not go to work or school    Do Not go to [employer],  centers, shopping, or other public places.    Do Not shake hands.    Avoid close and intimate contact with others (hugging, kissing).    Follow CDC recommendations for household cleaning of frequently touched services.     After the initial 7 days, continue to isolate yourself from household members as much as possible. To continue decrease the risk of community spread and exposure, you and any members of your household should limit activities in public for 14 days after starting home isolation.     You can reference the following CDC link for helpful home isolation/care tips:  https://www.cdc.gov/coronavirus/2019-ncov/downloads/10Things.pdf    Protect Others:    Cover Your Mouth and Nose with a mask, disposable tissue or wash cloth to avoid spreading germs to others.    Wash your hands and face frequently with soap and water    Call Your Primary Doctor If: Breathing difficulty develops or you become worse.    For more information about COVID19 and options for caring for yourself at home, please visit the CDC website at https://www.cdc.gov/coronavirus/2019-ncov/about/steps-when-sick.html  For more options for care at [facility], please visit our website at [email or web address]        [facility]      DISCHARGE INSTRUCTIONS FOLLOWING   REGIONAL BLOCK ANESTHESIA      Numbness or lack of feeling  "in the arm/leg that was operated may last up to 24 hours.  The average time is usually 10-15 hours.  You may not be able to lift or move the arm or leg where the operation was by itself during that time.  Long-acting local anesthetic medicines were used to give you long-lasting pain relief.    Wear a sling until your arm is completely \"awake\"    Avoid bumping your arm, leg or foot while it is numb    Avoid extremes of hot or cold while it is numb    Remain quiet and restful the day of surgery.  Resume normal activities gradually over the next day or so as advise by your surgeon.    Do not drive or operate  Any machinery until your extremity is full  \"awake\"        You will have a tingling and prickly sensation in your arm/leg as the feeling begins to return; you can also expect some discomfort. The amount of discomfort is unpredictable, but if you have more pain that can be controlled with the pain medication you received, you should contact your surgeon.  Start to take your pain pills as soon as you start to feel any discomfort or pain.        Reasons to contact your surgeon:    1. Signs of possible infection: Check your incision daily for redness, swelling, warmth, red streaks or foul drainage.   2. Elevated temperature.  3. Pain not controlled with pain medication and/or rest.   4. Uncontrolled nausea or vomiting.  Any questions or concerns.                **If you have questions or concerns about your procedure,   call Dr. Rankin at **  "

## 2022-02-25 NOTE — OR NURSING
Pt dressed, up in recliner and transported to Phase 2. Discharge instructioons by phone per Hospital COVID Policy w sister Mini- pt and sister verbalize understanding- denies questions- waiting for sister for discharge to home.

## 2022-02-25 NOTE — ANESTHESIA PROCEDURE NOTES
Brachial plexus Procedure Note    Pre-Procedure   Staff -        Anesthesiologist:  Dillon Dahl MD       Performed By: Anesthesiologist       Location: OR       Procedure Start/Stop Times: 2/25/2022 12:07 PM and 2/25/2022 12:15 PM       Pre-Anesthestic Checklist: patient identified, IV checked, site marked, risks and benefits discussed, informed consent, monitors and equipment checked, at physician/surgeon's request and post-op pain management  Timeout:       Correct Patient: Yes        Correct Procedure: Yes        Correct Site: Yes        Correct Position: Yes        Correct Laterality: Yes        Site Marked: Yes  Procedure Documentation  Procedure: Brachial plexus       Laterality: left       Patient Position: supine       Patient Prep/Sterile Barriers: sterile gloves, mask       Skin prep: Chloraprep      Local skin infiltrated with mL of 1% lidocaine.  (interscalene approach).       Needle Type: insulated and short bevel (Arrow)       Needle Gauge: 21.        Needle Length (millimeters): 90        Ultrasound guided       1. Ultrasound was used to identify targeted nerve, plexus, vascular marker, or fascial plane and place a needle adjacent to it in real-time.       2. Ultrasound was used to visualize the spread of anesthetic in close proximity to the above referenced structure.       3. A permanent image is entered into the patient's record.       4. The visualized anatomic structures appeared normal.       5. There were no apparent abnormal pathologic findings.    Assessment/Narrative         The placement was negative for: blood aspirated, painful injection and site bleeding       Paresthesias: No.     Bolus given via needle..        Secured via.        Insertion/Infusion Method: Single Shot       Complications: none    Medication(s) Administered   Ropivacaine 0.5% w/ 1:400K Epi (Injection), 20 mL  Medication Administration Time: 2/25/2022 12:15 PM     Comments:  Peripheral nerve block performed at  request of the primary medical team.      Postoperative pain block requested by surgeon for severe postoperative pain.  Patient brought to Preop for procedure.      Pt tolerated well.    No complications.      The surgeon has given a verbal order transferring care of this patient to me for the performance of a regional analgesia block for post-op pain control. It is requested of me because I am uniquely trained and qualified to perform this block and the surgeon is neither trained nor qualified to perform this procedure.    JOSE HERRERA MD   February 25, 2022 1:21 PM

## 2022-02-25 NOTE — OP NOTE
Procedure Date: 02/25/2022    PREOPERATIVE DIAGNOSES:    1.  Left surgical neck fracture of proximal humerus with complete displacement head from the shaft.  2.  Positive asymptomatic COVID.  3.  Delayed presentation due to hyponatremia, fracture 2 weeks old.    POSTOPERATIVE DIAGNOSES:     1.  Left surgical neck fracture of proximal humerus with complete displacement head from the shaft.  2.  Positive asymptomatic COVID.  3.  Delayed presentation due to hyponatremia, fracture 2 weeks old.    PROCEDURE PERFORMED:    1.  Open reduction and internal fixation of left proximal humerus fracture, surgical neck.  2.  Biceps tenodesis.  3.  Bone grafting, left proximal humerus.      SURGEON:  Carlos Rankin MD    ASSISTANTS:  Wayne Grant PA-C, whose assistance was critical for positioning, retraction during exposure, fracture reduction and placement of implants and closure.      ANESTHETICS  General and block.    ESTIMATED BLOOD LOSS:  100 mL    FINDINGS:  Humeral head completely displaced from the shaft.  Extensive fibrous tissue and head scarred down.  Biceps tenotomy and tendodesis.    INDICATIONS FOR PROCEDURE:  A 51-year-old female who was seen by a partner with a displaced left surgical neck fracture.  Due to medical clearance issues, OR availability as well as my expertise in fracture care, she was sent to me.  Her hyponatremia has improved.  Discussed operative versus nonoperative treatment and the risks and benefits of each.  The risks of her condition and surgery discussed included but were not limited to bleeding, infection, damage to surrounding neurovascular structures, malunion, delayed union, nonunion, avascular necrosis, shoulder stiffness, need for later shoulder replacement, prolonged recovery and anesthetic complications.  She understands and wished to proceed.  Consent signed.    DESCRIPTION OF PROCEDURE:  The patient was identified in preoperative holding area per hospital policy, correct  operative site marked.  She had been given a block, to the OR and to the OR table.  General anesthesia induced, placed on a regular bed in gentle beach chair position.  All bony prominences were well padded.  Chlorhexidine prescrub, ChloraPrep, draped.  A timeout performed, all in the room agreed.  I made a deltopectoral approach, dissecting through skin and subcutaneous tissue, identified protecting the cephalic vein, retracting it medially.  Exploited the deltopectoral interval and dissected down onto the fracture site.  The shaft was significantly displaced anteriorly with the humeral head completely off the shaft.  Abundant interval callus formation.  The biceps tendon was trapped at the fracture site.  Biceps tenotomy was performed and tagged and, at the end of the case, tenodesed to the inferior border of the pec.  A significant amount of time was spent mobilizing the shaft and then the calcar and proximal humerus fragments.  I got an excellent read at the lateral cortex and calcar.  There was gaps and comminution but solid anterior and lateral reads.  This took about an hour to achieve, taking down the callus and reducing the fracture.  Provisionally pinned.  Released some of anterior deltoid insertion to allow for plate placement.  Selected a Sustain360 proximal humerus plate, pinning provisionally and then placing a shaft screw, but not all the way down.  Cancellous screw was used proximally to bring the plate to the bone, followed by locking screws, then the cortical shaft screws were placed.  The last cortical screw was slightly short, so I lengthened it and turned it into a cancellous.  The 2 proximal screws had excellent purchase, the bottom screw with good purchase.  Final x-rays showed near anatomic reduction, safe position of the implants in AP, oblique and lateral views.  The proximal humerus was then grafted with 5 mL of DBX to fill the fracture gaps and humeral head.    I copiously irrigated the  wound, repaired the subscap and pec, which had needed to be taken down to mobilize the humeral head.  This was repaired down to the plate.      Deltopectoral interval marked with Ethibond.  Closed the deltopectoral interval then with 0 Vicryl, 2-0 Vicryl, staples, sterile dressings and a sling applied, awakened, transferred stable to the PACU.    POSTOPERATIVE PLAN:     1.  Okay to use the hand to eat and drink if comfortable, no lifting heavier than a coffee cup.  2.  Sling -- Avoid external rotation past neutral.  3.  Gentle Codman exercises starting at 2 weeks.  4.  Two-week followup for wound check, staples out.  5.  Begin gentle physical therapy at 4 weeks.  6.  Vitamin D 2000 units daily.      Carlos Rankin MD        D: 2022   T: 2022   MT: MYARNDA    Name:     BENJAMIN WEBB  MRN:      [id number]        Account:        [id number]   :      1970           Procedure Date: 2022     Document: [id number]

## 2022-02-25 NOTE — PROGRESS NOTES
Prior Authorization Approval    traMADol 50mg tabs  Date Initiated: 2/25/2022  Date Completed: 2/25/2022  Prior Auth Type: Quantity Limit                Status: Approved    Effective Date: 02/25/2022 - 08/24/2022  Copay: 0.49     Filling Pharmacy: [facility] - [address] [place] [address]    Insurance: [employer] - Phone [phone] Fax [phone]  ID: [id number]  Case Number: [id number] / [id number]   Submitted Via: Aurelia Pulido  Covering Mel Herrera 2/25  [facility] Liaison  Ph: [phone] Pager: [phone]

## 2022-02-25 NOTE — OR NURSING
AOX3-VSS-O2 sats >92% RA- Good PO intake, Denies c/o- Pt and responsible adult verbalize understanding of discharge instructions, denies questions. Up in W/C - transported to door for discharge to home by staff (this Nurse) per [facility] Policy   18-Nov-2019 12:42

## 2022-02-26 NOTE — ANESTHESIA POSTPROCEDURE EVALUATION
Patient: Ivon Sheets    Procedure: Procedure(s):  OPEN REDUCTION INTERNAL FIXATION LEFT PROXIMAL HUMERUS FRACTURE       Anesthesia Type:  General    Note:  Disposition: Outpatient   Postop Pain Control: Uneventful            Sign Out: Well controlled pain   PONV: No   Neuro/Psych: Uneventful            Sign Out: Acceptable/Baseline neuro status   Airway/Respiratory: Uneventful            Sign Out: Acceptable/Baseline resp. status   CV/Hemodynamics: Uneventful            Sign Out: Acceptable CV status; No obvious hypovolemia; No obvious fluid overload   Other NRE: NONE   DID A NON-ROUTINE EVENT OCCUR? No           Last vitals:  Vitals Value Taken Time   /79 02/25/22 1540   Temp 36.7  C (98  F) 02/25/22 1530   Pulse 107 02/25/22 1544   Resp 16 02/25/22 1540   SpO2 93 % 02/25/22 1544   Vitals shown include unvalidated device data.    Electronically Signed By: Clarence Zuniga MD  February 25, 2022  8:26 PM

## 2022-10-29 ENCOUNTER — HEALTH MAINTENANCE LETTER (OUTPATIENT)
Age: 52
End: 2022-10-29

## 2023-11-12 ENCOUNTER — HEALTH MAINTENANCE LETTER (OUTPATIENT)
Age: 53
End: 2023-11-12

## 2024-12-28 ENCOUNTER — HEALTH MAINTENANCE LETTER (OUTPATIENT)
Age: 54
End: 2024-12-28

## (undated) DEVICE — DRSG ABDOMINAL 07 1/2X8" 7197D

## (undated) DEVICE — SU MONOCRYL 2-0 CT-2 27" Y333H

## (undated) DEVICE — Device

## (undated) DEVICE — SU ETHIBOND 2 V-37 4X30" MX69G

## (undated) DEVICE — DRSG KERLIX FLUFFS X5

## (undated) DEVICE — DRSG STERI STRIP 1/2X4" R1547

## (undated) DEVICE — DRAPE STERI U 1015

## (undated) DEVICE — SU MONOCRYL 3-0 PS-2 27" Y427H

## (undated) DEVICE — GLOVE PROTEXIS BLUE W/NEU-THERA 6.5  2D73EB65

## (undated) DEVICE — DRAPE STERI TOWEL LG 1010

## (undated) DEVICE — SYR 10ML FINGER CONTROL W/O NDL 309695

## (undated) DEVICE — DRILL BIT STRK DISP 2.5X216MM NON-LOCKING SHORT 705025

## (undated) DEVICE — DRSG TEGADERM 4X10" 1627

## (undated) DEVICE — GLOVE PROTEXIS POWDER FREE 7.5 ORTHOPEDIC 2D73ET75

## (undated) DEVICE — ADH LIQUID MASTISOL TOPICAL VIAL 2-3ML 0523-48

## (undated) DEVICE — GLOVE PROTEXIS BLUE W/NEU-THERA 7.5  2D73EB75

## (undated) DEVICE — DRSG GAUZE 4X4" 2187

## (undated) DEVICE — GLOVE PROTEXIS POWDER FREE 8.0 ORTHOPEDIC 2D73ET80

## (undated) DEVICE — ESU ELEC NDL 1" E1552

## (undated) DEVICE — GLOVE PROTEXIS POWDER FREE 6.5 ORTHOPEDIC 2D73ET65

## (undated) DEVICE — STPL SKIN 35W 6.9MM  PXW35

## (undated) DEVICE — IMP SCR STRK AXSOS3 CORT ST 3.5X26MM TI 661426: Type: IMPLANTABLE DEVICE | Site: SHOULDER | Status: NON-FUNCTIONAL

## (undated) DEVICE — CAST PADDING 4" COTTON WEBRIL STERILE 9084S

## (undated) DEVICE — GOWN IMPERVIOUS SPECIALTY XL/XLONG 39049

## (undated) DEVICE — PACK OPEN SHOULDER SOP15OCFSC

## (undated) DEVICE — SOL NACL 0.9% IRRIG 1000ML BOTTLE 2F7124

## (undated) DEVICE — LINEN TOWEL PACK X5 5464

## (undated) DEVICE — NDL 22GA 1.5"

## (undated) DEVICE — PREP CHLORAPREP 26ML TINTED ORANGE  260815

## (undated) DEVICE — KIT SHOULDER POSITIONING BEACH CHAIR ARM HOLDER AR-1644

## (undated) DEVICE — DRSG XEROFORM 5X9" 8884431605

## (undated) DEVICE — DRSG ADAPTIC 3X8" 6113

## (undated) DEVICE — SPONGE RAY-TEC 4X8" 7318

## (undated) DEVICE — SU VICRYL 2-0 CP-1 27" UND J266H

## (undated) DEVICE — SU ETHIBOND 1 OS-4 30" X518H

## (undated) DEVICE — SUCTION FRAZIER 12FR W/HANDLE K73

## (undated) DEVICE — DRSG STERI STRIP 1/2X4" B1557

## (undated) DEVICE — PACK SET-UP STD 9102

## (undated) DEVICE — ESU GROUND PAD UNIVERSAL W/O CORD

## (undated) DEVICE — GLOVE PROTEXIS BLUE W/NEU-THERA 8.0  2D73EB80

## (undated) DEVICE — SOL WATER IRRIG 1000ML BOTTLE 2F7114

## (undated) DEVICE — ESU CLEANER TIP 31142717

## (undated) DEVICE — MANIFOLD NEPTUNE 4 PORT 700-20

## (undated) DEVICE — DRAPE SHEET REV FOLD 3/4 9349

## (undated) RX ORDER — CEFAZOLIN SODIUM/WATER 2 G/20 ML
SYRINGE (ML) INTRAVENOUS
Status: DISPENSED
Start: 2022-02-25

## (undated) RX ORDER — FENTANYL CITRATE 50 UG/ML
INJECTION, SOLUTION INTRAMUSCULAR; INTRAVENOUS
Status: DISPENSED
Start: 2022-02-25

## (undated) RX ORDER — OXYCODONE HYDROCHLORIDE 5 MG/1
TABLET ORAL
Status: DISPENSED
Start: 2022-02-25

## (undated) RX ORDER — BUPIVACAINE HYDROCHLORIDE AND EPINEPHRINE 2.5; 5 MG/ML; UG/ML
INJECTION, SOLUTION EPIDURAL; INFILTRATION; INTRACAUDAL; PERINEURAL
Status: DISPENSED
Start: 2022-02-25